# Patient Record
Sex: MALE | Race: WHITE | NOT HISPANIC OR LATINO | Employment: FULL TIME | URBAN - METROPOLITAN AREA
[De-identification: names, ages, dates, MRNs, and addresses within clinical notes are randomized per-mention and may not be internally consistent; named-entity substitution may affect disease eponyms.]

---

## 2017-05-05 ENCOUNTER — HOSPITAL ENCOUNTER (INPATIENT)
Facility: HOSPITAL | Age: 42
LOS: 2 days | Discharge: HOME | DRG: 379 | End: 2017-05-07
Attending: EMERGENCY MEDICINE | Admitting: INTERNAL MEDICINE
Payer: COMMERCIAL

## 2017-05-05 DIAGNOSIS — I10 ESSENTIAL HYPERTENSION: ICD-10-CM

## 2017-05-05 DIAGNOSIS — K92.2 LOWER GI BLEED: Primary | ICD-10-CM

## 2017-05-05 DIAGNOSIS — D64.9 ANEMIA, UNSPECIFIED TYPE: ICD-10-CM

## 2017-05-05 DIAGNOSIS — E87.6 HYPOKALEMIA: ICD-10-CM

## 2017-05-05 DIAGNOSIS — R42 ORTHOSTATIC DIZZINESS: ICD-10-CM

## 2017-05-05 PROBLEM — E78.5 HLD (HYPERLIPIDEMIA): Status: ACTIVE | Noted: 2017-05-05

## 2017-05-05 LAB
ALBUMIN SERPL BCP-MCNC: 4 G/DL
ALP SERPL-CCNC: 99 U/L
ALT SERPL W/O P-5'-P-CCNC: 24 U/L
ANION GAP SERPL CALC-SCNC: 13 MMOL/L
ANISOCYTOSIS BLD QL SMEAR: SLIGHT
APTT BLDCRRT: 22.8 SEC
AST SERPL-CCNC: 26 U/L
BASOPHILS # BLD AUTO: 0.06 K/UL
BASOPHILS NFR BLD: 0.7 %
BILIRUB SERPL-MCNC: 0.3 MG/DL
BILIRUB UR QL STRIP: NEGATIVE
BUN SERPL-MCNC: 23 MG/DL
CALCIUM SERPL-MCNC: 9.2 MG/DL
CHLORIDE SERPL-SCNC: 104 MMOL/L
CLARITY UR: CLEAR
CO2 SERPL-SCNC: 22 MMOL/L
COLOR UR: YELLOW
CREAT SERPL-MCNC: 1.1 MG/DL
DACRYOCYTES BLD QL SMEAR: ABNORMAL
DIFFERENTIAL METHOD: ABNORMAL
EOSINOPHIL # BLD AUTO: 0.2 K/UL
EOSINOPHIL NFR BLD: 1.8 %
ERYTHROCYTE [DISTWIDTH] IN BLOOD BY AUTOMATED COUNT: 16.7 %
EST. GFR  (AFRICAN AMERICAN): >60 ML/MIN/1.73 M^2
EST. GFR  (NON AFRICAN AMERICAN): >60 ML/MIN/1.73 M^2
GLUCOSE SERPL-MCNC: 129 MG/DL
GLUCOSE UR QL STRIP: NEGATIVE
HCT VFR BLD AUTO: 20.3 %
HGB BLD-MCNC: 6.5 G/DL
HGB UR QL STRIP: NEGATIVE
HYPOCHROMIA BLD QL SMEAR: ABNORMAL
INR PPP: 1
KETONES UR QL STRIP: NEGATIVE
LEUKOCYTE ESTERASE UR QL STRIP: NEGATIVE
LYMPHOCYTES # BLD AUTO: 2.4 K/UL
LYMPHOCYTES NFR BLD: 28.6 %
MCH RBC QN AUTO: 21.6 PG
MCHC RBC AUTO-ENTMCNC: 32 %
MCV RBC AUTO: 67 FL
MONOCYTES # BLD AUTO: 0.9 K/UL
MONOCYTES NFR BLD: 10.2 %
NEUTROPHILS # BLD AUTO: 4.9 K/UL
NEUTROPHILS NFR BLD: 59.1 %
NITRITE UR QL STRIP: NEGATIVE
OVALOCYTES BLD QL SMEAR: ABNORMAL
PH UR STRIP: 6 [PH] (ref 5–8)
PLATELET # BLD AUTO: 449 K/UL
PLATELET BLD QL SMEAR: ABNORMAL
PMV BLD AUTO: 8.3 FL
POIKILOCYTOSIS BLD QL SMEAR: SLIGHT
POLYCHROMASIA BLD QL SMEAR: ABNORMAL
POTASSIUM SERPL-SCNC: 3.2 MMOL/L
PROT SERPL-MCNC: 8.9 G/DL
PROT UR QL STRIP: NEGATIVE
PROTHROMBIN TIME: 10.5 SEC
RBC # BLD AUTO: 3.01 M/UL
SODIUM SERPL-SCNC: 139 MMOL/L
SP GR UR STRIP: 1.02 (ref 1–1.03)
TARGETS BLD QL SMEAR: ABNORMAL
URN SPEC COLLECT METH UR: NORMAL
UROBILINOGEN UR STRIP-ACNC: NEGATIVE EU/DL
WBC # BLD AUTO: 8.35 K/UL

## 2017-05-05 PROCEDURE — 25000003 PHARM REV CODE 250: Performed by: EMERGENCY MEDICINE

## 2017-05-05 PROCEDURE — 25000003 PHARM REV CODE 250: Performed by: NURSE PRACTITIONER

## 2017-05-05 PROCEDURE — 86901 BLOOD TYPING SEROLOGIC RH(D): CPT

## 2017-05-05 PROCEDURE — 99285 EMERGENCY DEPT VISIT HI MDM: CPT | Mod: 25

## 2017-05-05 PROCEDURE — 85025 COMPLETE CBC W/AUTO DIFF WBC: CPT

## 2017-05-05 PROCEDURE — 36000 PLACE NEEDLE IN VEIN: CPT

## 2017-05-05 PROCEDURE — 86920 COMPATIBILITY TEST SPIN: CPT

## 2017-05-05 PROCEDURE — 81003 URINALYSIS AUTO W/O SCOPE: CPT

## 2017-05-05 PROCEDURE — 80053 COMPREHEN METABOLIC PANEL: CPT

## 2017-05-05 PROCEDURE — 85610 PROTHROMBIN TIME: CPT

## 2017-05-05 PROCEDURE — 85730 THROMBOPLASTIN TIME PARTIAL: CPT

## 2017-05-05 PROCEDURE — 21400001 HC TELEMETRY ROOM

## 2017-05-05 PROCEDURE — 86900 BLOOD TYPING SEROLOGIC ABO: CPT

## 2017-05-05 PROCEDURE — 86850 RBC ANTIBODY SCREEN: CPT

## 2017-05-05 RX ORDER — ONDANSETRON 2 MG/ML
4 INJECTION INTRAMUSCULAR; INTRAVENOUS EVERY 12 HOURS PRN
Status: DISCONTINUED | OUTPATIENT
Start: 2017-05-05 | End: 2017-05-05

## 2017-05-05 RX ORDER — ATORVASTATIN CALCIUM 10 MG/1
10 TABLET, FILM COATED ORAL DAILY
COMMUNITY

## 2017-05-05 RX ORDER — HYDROCHLOROTHIAZIDE 12.5 MG/1
12.5 TABLET ORAL DAILY
COMMUNITY

## 2017-05-05 RX ORDER — AMLODIPINE BESYLATE 5 MG/1
5 TABLET ORAL DAILY
COMMUNITY

## 2017-05-05 RX ORDER — ALPRAZOLAM 0.5 MG/1
0.5 TABLET ORAL 3 TIMES DAILY
COMMUNITY

## 2017-05-05 RX ORDER — POLYETHYLENE GLYCOL 3350, SODIUM SULFATE ANHYDROUS, SODIUM BICARBONATE, SODIUM CHLORIDE, POTASSIUM CHLORIDE 236; 22.74; 6.74; 5.86; 2.97 G/4L; G/4L; G/4L; G/4L; G/4L
4000 POWDER, FOR SOLUTION ORAL ONCE
Status: COMPLETED | OUTPATIENT
Start: 2017-05-05 | End: 2017-05-05

## 2017-05-05 RX ORDER — HYDROCODONE BITARTRATE AND ACETAMINOPHEN 500; 5 MG/1; MG/1
TABLET ORAL
Status: DISCONTINUED | OUTPATIENT
Start: 2017-05-05 | End: 2017-05-06

## 2017-05-05 RX ORDER — ONDANSETRON 2 MG/ML
4 INJECTION INTRAMUSCULAR; INTRAVENOUS EVERY 8 HOURS PRN
Status: DISCONTINUED | OUTPATIENT
Start: 2017-05-05 | End: 2017-05-07 | Stop reason: HOSPADM

## 2017-05-05 RX ORDER — SODIUM CHLORIDE 9 MG/ML
INJECTION, SOLUTION INTRAVENOUS CONTINUOUS
Status: DISCONTINUED | OUTPATIENT
Start: 2017-05-05 | End: 2017-05-07 | Stop reason: HOSPADM

## 2017-05-05 RX ORDER — ACETAMINOPHEN 325 MG/1
650 TABLET ORAL EVERY 8 HOURS PRN
Status: DISCONTINUED | OUTPATIENT
Start: 2017-05-05 | End: 2017-05-07 | Stop reason: HOSPADM

## 2017-05-05 RX ORDER — POTASSIUM CHLORIDE 20 MEQ/1
40 TABLET, EXTENDED RELEASE ORAL ONCE
Status: COMPLETED | OUTPATIENT
Start: 2017-05-05 | End: 2017-05-05

## 2017-05-05 RX ORDER — PANTOPRAZOLE SODIUM 40 MG/10ML
40 INJECTION, POWDER, LYOPHILIZED, FOR SOLUTION INTRAVENOUS 2 TIMES DAILY
Status: DISCONTINUED | OUTPATIENT
Start: 2017-05-05 | End: 2017-05-07 | Stop reason: HOSPADM

## 2017-05-05 RX ORDER — ATORVASTATIN CALCIUM 10 MG/1
10 TABLET, FILM COATED ORAL DAILY
Status: DISCONTINUED | OUTPATIENT
Start: 2017-05-06 | End: 2017-05-07 | Stop reason: HOSPADM

## 2017-05-05 RX ORDER — IPRATROPIUM BROMIDE AND ALBUTEROL SULFATE 2.5; .5 MG/3ML; MG/3ML
3 SOLUTION RESPIRATORY (INHALATION) EVERY 6 HOURS PRN
Status: DISCONTINUED | OUTPATIENT
Start: 2017-05-05 | End: 2017-05-07 | Stop reason: HOSPADM

## 2017-05-05 RX ADMIN — SODIUM CHLORIDE: 0.9 INJECTION, SOLUTION INTRAVENOUS at 11:05

## 2017-05-05 RX ADMIN — POTASSIUM CHLORIDE 40 MEQ: 1500 TABLET, EXTENDED RELEASE ORAL at 08:05

## 2017-05-05 RX ADMIN — POLYETHYLENE GLYCOL 3350, SODIUM SULFATE ANHYDROUS, SODIUM BICARBONATE, SODIUM CHLORIDE, POTASSIUM CHLORIDE 4000 ML: 236; 22.74; 6.74; 5.86; 2.97 POWDER, FOR SOLUTION ORAL at 08:05

## 2017-05-05 NOTE — ASSESSMENT & PLAN NOTE
- Borderline hypotension -- orthostatic -- Hold Amlodipine and HCTZ  - IVF  - Resume BB with parameters -- Hold BB if systolic BP < 110 mmHg and/or HR < 60 BPM

## 2017-05-05 NOTE — ED PROVIDER NOTES
"SCRIBE #1 NOTE: I, Ignacio Sanchez, am scribing for, and in the presence of, Sohan Layton MD. I have scribed the entire note.      History      Chief Complaint   Patient presents with    Cough     prolonged cough since lanuary, dyspnea on exertion    Jaundice     jaundice appearing, rectal bleeding reported        Review of patient's allergies indicates:  No Known Allergies     HPI   HPI    5/5/2017, 4:07 PM   History obtained from the patient      History of Present Illness: Doug Arauz is a 42 y.o. male patient who presents to the Emergency Department for blood in stool which onset suddenly 4 hours PTA. Symptoms are intermittent and moderate in severity. Pt states "1 gallon of blood" came out when he went to the bathroom. Pt was seen by Dr. Ibarra and was told to come to the ED for an evaluation. Pt has had a cough since January. Pt gets dizzy when he stands up. No mitigating or exacerbating factors reported. Associated sxs include n/v and fatigue. Patient denies any fever, chills, abdominal pain, CP, SOB, HA, numbness, weakness, dysuria, hematuria, and all other sxs at this time. No further complaints or concerns at this time.         Arrival mode: Personal vehicle     PCP: Niko Camacho MD       Past Medical History:  Past Medical History:   Diagnosis Date    Asthma     Colon polyps     Hiatal hernia     High cholesterol     Hypertension     Macular degeneration     RVOT ventricular tachycardia        Past Surgical History:  Past surgical history reviewed not relevant      Family History:  Family history reviewed not relevant      Social History:  Social History    Social History Main Topics    Social History Main Topics    Smoking status: Unknown if ever smoked    Smokeless tobacco: Unknown if ever used    Alcohol Use: Unknown drinking history    Drug Use: Unknown if ever used    Sexual Activity: Unknown       ROS   Review of Systems   Constitutional: Positive for fatigue. Negative for " chills and fever.   HENT: Negative for sore throat.    Respiratory: Positive for cough. Negative for shortness of breath.    Cardiovascular: Negative for chest pain.   Gastrointestinal: Positive for blood in stool, nausea and vomiting. Negative for abdominal pain.   Genitourinary: Negative for dysuria and hematuria.   Musculoskeletal: Negative for back pain.   Skin: Negative for rash.   Neurological: Positive for dizziness. Negative for weakness, light-headedness, numbness and headaches.   Hematological: Does not bruise/bleed easily.       Physical Exam    Initial Vitals   BP Pulse Resp Temp SpO2   05/05/17 1334 05/05/17 1334 05/05/17 1334 05/05/17 1334 05/05/17 1334   142/74 98 16 98.6 °F (37 °C) 99 %      Physical Exam  Nursing Notes and Vital Signs Reviewed.  Constitutional: Patient is in no acute distress. Awake and alert. Well-developed and well-nourished.  Head: Atraumatic. Normocephalic.  Eyes: PERRL. EOM intact. Conjunctivae are not pale. No scleral icterus.  ENT: Mucous membranes are moist. Oropharynx is clear and symmetric.    Neck: Supple. Full ROM. No lymphadenopathy.  Cardiovascular: Tachycardic.  Regular rhythm. No murmurs, rubs, or gallops. Distal pulses are 2+ and symmetric.  Pulmonary/Chest: No respiratory distress. Clear to auscultation bilaterally. No wheezing, rales, or rhonchi.  Abdominal: Soft and non-distended.  There is no tenderness.  No rebound, guarding, or rigidity.  Musculoskeletal: Moves all extremities. No obvious deformities. No edema.  Skin: Pale.  Neurological:  Alert, awake, and appropriate.  Normal speech.  No acute focal neurological deficits are appreciated.  Psychiatric: Normal affect. Good eye contact. Appropriate in content.    ED Course    Procedures  ED Vital Signs:  Vitals:    05/05/17 1334 05/05/17 1556 05/05/17 1646 05/05/17 1647   BP: (!) 142/74 125/69 118/68 124/76   Pulse: 98 (!) 120     Resp: 16 20     Temp: 98.6 °F (37 °C) 98.4 °F (36.9 °C)     TempSrc: Oral Oral    "  SpO2: 99% 100%     Weight: 81.6 kg (180 lb)      Height: 5' 6" (1.676 m)       05/05/17 1648 05/05/17 1720 05/05/17 1721 05/05/17 1749   BP: 102/71   (!) 117/48   Pulse:  110  (!) 111   Resp:  (!) 29  (!) 22   Temp:   97.8 °F (36.6 °C)    TempSrc:   Oral    SpO2:  100%  100%   Weight:       Height:        05/05/17 1921 05/05/17 2215   BP: 118/72 118/75   Pulse: 109 97   Resp: (!) 24 (!) 22   Temp:  98.9 °F (37.2 °C)   TempSrc:  Oral   SpO2: 100% 99%   Weight:  81.8 kg (180 lb 6.4 oz)   Height:  5' 6" (1.676 m)       Abnormal Lab Results:  Labs Reviewed   CBC W/ AUTO DIFFERENTIAL - Abnormal; Notable for the following:        Result Value    RBC 3.01 (*)     Hemoglobin 6.5 (*)     Hematocrit 20.3 (*)     MCV 67 (*)     MCH 21.6 (*)     RDW 16.7 (*)     Platelets 449 (*)     MPV 8.3 (*)     Platelet Estimate Increased (*)     All other components within normal limits   COMPREHENSIVE METABOLIC PANEL - Abnormal; Notable for the following:     Potassium 3.2 (*)     CO2 22 (*)     Glucose 129 (*)     BUN, Bld 23 (*)     Total Protein 8.9 (*)     All other components within normal limits   URINALYSIS   APTT   PROTIME-INR   TYPE & SCREEN   PREPARE RBC SOFT        All Lab Results:  Results for orders placed or performed during the hospital encounter of 05/05/17   CBC auto differential   Result Value Ref Range    WBC 8.35 3.90 - 12.70 K/uL    RBC 3.01 (L) 4.60 - 6.20 M/uL    Hemoglobin 6.5 (L) 14.0 - 18.0 g/dL    Hematocrit 20.3 (L) 40.0 - 54.0 %    MCV 67 (L) 82 - 98 fL    MCH 21.6 (L) 27.0 - 31.0 pg    MCHC 32.0 32.0 - 36.0 %    RDW 16.7 (H) 11.5 - 14.5 %    Platelets 449 (H) 150 - 350 K/uL    MPV 8.3 (L) 9.2 - 12.9 fL    Gran # 4.9 1.8 - 7.7 K/uL    Lymph # 2.4 1.0 - 4.8 K/uL    Mono # 0.9 0.3 - 1.0 K/uL    Eos # 0.2 0.0 - 0.5 K/uL    Baso # 0.06 0.00 - 0.20 K/uL    Gran% 59.1 38.0 - 73.0 %    Lymph% 28.6 18.0 - 48.0 %    Mono% 10.2 4.0 - 15.0 %    Eosinophil% 1.8 0.0 - 8.0 %    Basophil% 0.7 0.0 - 1.9 %    Platelet " Estimate Increased (A)     Aniso Slight     Poik Slight     Poly Occasional     Hypo Occasional     Ovalocytes Occasional     Target Cells Moderate     Tear Drop Cells Moderate     Differential Method Automated    Comprehensive metabolic panel   Result Value Ref Range    Sodium 139 136 - 145 mmol/L    Potassium 3.2 (L) 3.5 - 5.1 mmol/L    Chloride 104 95 - 110 mmol/L    CO2 22 (L) 23 - 29 mmol/L    Glucose 129 (H) 70 - 110 mg/dL    BUN, Bld 23 (H) 6 - 20 mg/dL    Creatinine 1.1 0.5 - 1.4 mg/dL    Calcium 9.2 8.7 - 10.5 mg/dL    Total Protein 8.9 (H) 6.0 - 8.4 g/dL    Albumin 4.0 3.5 - 5.2 g/dL    Total Bilirubin 0.3 0.1 - 1.0 mg/dL    Alkaline Phosphatase 99 55 - 135 U/L    AST 26 10 - 40 U/L    ALT 24 10 - 44 U/L    Anion Gap 13 8 - 16 mmol/L    eGFR if African American >60 >60 mL/min/1.73 m^2    eGFR if non African American >60 >60 mL/min/1.73 m^2   Urinalysis   Result Value Ref Range    Specimen UA Urine, Clean Catch     Color, UA Yellow Yellow, Straw, Hawa    Appearance, UA Clear Clear    pH, UA 6.0 5.0 - 8.0    Specific Gravity, UA 1.020 1.005 - 1.030    Protein, UA Negative Negative    Glucose, UA Negative Negative    Ketones, UA Negative Negative    Bilirubin (UA) Negative Negative    Occult Blood UA Negative Negative    Nitrite, UA Negative Negative    Urobilinogen, UA Negative <2.0 EU/dL    Leukocytes, UA Negative Negative   APTT   Result Value Ref Range    aPTT 22.8 21.0 - 32.0 sec   Protime-INR   Result Value Ref Range    Prothrombin Time 10.5 9.0 - 12.5 sec    INR 1.0 0.8 - 1.2         Imaging Results:  Imaging Results         NM GI Bleed Study (Final result) Result time:  05/05/17 22:15:07    Final result by Zhang Car MD (05/05/17 22:15:07)    Impression:       1.  Over the 1st 55 minutes, no definite evidence for GI bleed, however, progressive uptake in the urinary bladder, likely representing free technetium, could obscure a very distal rectal or anal bleed.  2.  On the 60 minute image, there  was a similar distribution of the radiopharmaceutical.  3.  At no time, was there evidence for abnormal uptake involving the small bowel or the vast majority of the colon.         Electronically signed by: JASON LÓPEZ MD  Date:     05/05/17  Time:    22:15     Narrative:    Nuclear medicine GI bleeding scan    Clinical Indication:    Lower GI bleeding with bright red blood per rectum.      Technique: 25 mCi of technetium 99m labeled tagged red blood cells was injected in the patient with imaging obtained for one hour.     Findings:  No comparison studies are available.  There was progressive activity centered in the inferior pelvis, likely representing the urinary bladder.  No other abnormal uptake was seen over the 1st 55 minutes of the study.  At the 55 minute daniele, the patient needs to urinate.  The patient went to the restroom, and urinated, and also described bright red blood per rectum.  A 60 minute image was obtained, demonstrating a similar appearance to the distribution of the radiopharmaceutical.            X-Ray Chest PA And Lateral (Final result) Result time:  05/05/17 16:56:57    Final result by Ignacio Fried III, MD (05/05/17 16:56:57)    Impression:         Negative two-view chest x-ray.      Electronically signed by: IGNACIO FRIED MD  Date:     05/05/17  Time:    16:56     Narrative:    Two-view chest x-ray.    Clinical indication: coughwith    Heart size is normal. The lung fields are clear. No acute pulmonary infiltrate.                      The Emergency Provider reviewed the vital signs and test results, which are outlined above.    ED Discussion     5:15 PM: Re-evaluated pt. Pt is ok with a blood transfusion. I have discussed test results, shared treatment plan, and the need for admission with patient and family at bedside. Pt and family express understanding at this time and agree with all information. All questions answered. Pt and family have no further questions or concerns at  this time. Pt is ready for admit.    5:38 PM: Dr. Layton discussed the pt's case with Alyx King NP ([facility]) who recommends to talk to GI before admission.    5:44 PM: Dr. Layton discussed the pt's case with Dr. Gomes (GI) who recommends a NM bleed study, clear liquids overnight, and a possible colonoscopy tomorrow.    6:06 PM: Discussed case with Alyx King NP ([facility]). Dr. Hernandez agrees with current care and management of pt and accepts admission.   Admitting Service: Hospital medicine   Admitting Physician: Dr. Hernandez  Admit to: Telemetry          ED Medication(s):  Medications   0.9%  NaCl infusion (for blood administration) (not administered)   promethazine (PHENERGAN) 6.25 mg in dextrose 5 % 50 mL IVPB (not administered)   acetaminophen tablet 650 mg (not administered)   0.9%  NaCl infusion (not administered)   atorvastatin tablet 10 mg (not administered)   propranolol 24 hr capsule 80 mg (not administered)   pantoprazole injection 40 mg (not administered)   ondansetron injection 4 mg (not administered)   albuterol-ipratropium 2.5mg-0.5mg/3mL nebulizer solution 3 mL (not administered)   polyethylene glycol (GoLYTELY) solution (4,000 mLs Oral Given 5/5/17 2001)   potassium chloride SA CR tablet 40 mEq (40 mEq Oral Given 5/5/17 2001)       Current Discharge Medication List                Medical Decision Making    Medical Decision Making:   Clinical Tests:   Lab Tests: Reviewed and Ordered  Radiological Study: Reviewed and Ordered           Scribe Attestation:   Scribe #1: I performed the above scribed service and the documentation accurately describes the services I performed. I attest to the accuracy of the note.    Attending:   Physician Attestation Statement for Scribe #1: I, Sohan Layton MD, personally performed the services described in this documentation, as scribed by Ignacio Sanchez, in my presence, and it is both accurate and complete.          Clinical Impression        ICD-10-CM ICD-9-CM   1. Lower GI bleed K92.2 578.9   2. Anemia, unspecified type D64.9 285.9   3. Orthostatic dizziness R42 780.4       Disposition:   Disposition: Admitted  Condition: Fair         Sohan Layton MD  05/05/17 4786

## 2017-05-05 NOTE — SUBJECTIVE & OBJECTIVE
Past Medical History:   Diagnosis Date    Asthma     Colon polyps     Hiatal hernia     High cholesterol     Hypertension     Macular degeneration     RVOT ventricular tachycardia        No past surgical history on file.    Review of patient's allergies indicates:  No Known Allergies    No current facility-administered medications on file prior to encounter.      No current outpatient prescriptions on file prior to encounter.     Family History     None        Social History Main Topics    Smoking status: Not on file    Smokeless tobacco: Not on file    Alcohol use Not on file    Drug use: Not on file    Sexual activity: Not on file     Review of Systems   Constitutional: Negative for appetite change, chills, fatigue and fever.   HENT: Negative for tinnitus and trouble swallowing.    Eyes: Negative.    Respiratory: Positive for cough. Negative for apnea, choking, chest tightness, shortness of breath, wheezing and stridor.    Cardiovascular: Negative for chest pain, palpitations and leg swelling.   Gastrointestinal: Positive for blood in stool. Negative for abdominal pain, diarrhea, nausea and vomiting.   Endocrine: Negative.    Genitourinary: Negative.    Musculoskeletal: Negative.    Skin: Negative.    Allergic/Immunologic: Negative.    Neurological: Negative for dizziness, tremors, seizures, syncope, facial asymmetry, speech difficulty, weakness, light-headedness, numbness and headaches.   Hematological: Negative.      Objective:     Vital Signs (Most Recent):  Temp: 97.8 °F (36.6 °C) (05/05/17 1721)  Pulse: 110 (05/05/17 1720)  Resp: (!) 29 (05/05/17 1720)  BP: 102/71 (05/05/17 1648)  SpO2: 100 % (05/05/17 1720) Vital Signs (24h Range):  Temp:  [97.8 °F (36.6 °C)-98.6 °F (37 °C)] 97.8 °F (36.6 °C)  Pulse:  [] 110  Resp:  [16-29] 29  SpO2:  [99 %-100 %] 100 %  BP: (102-142)/(68-76) 102/71     Weight: 81.6 kg (180 lb)  Body mass index is 29.05 kg/(m^2).    Physical Exam   Constitutional: He is  oriented to person, place, and time. He appears well-developed.   HENT:   Head: Normocephalic and atraumatic.   Eyes: Conjunctivae and EOM are normal.   Pale conjunctiva   Neck: Normal range of motion. Neck supple.   Cardiovascular: Intact distal pulses.  Tachycardia present.    Pulmonary/Chest: Effort normal and breath sounds normal. No respiratory distress.   Abdominal: Soft. Bowel sounds are normal. There is no tenderness.   Musculoskeletal: Normal range of motion.   Neurological: He is alert and oriented to person, place, and time.   Skin: Skin is warm and dry.   Psychiatric: He has a normal mood and affect. His behavior is normal. Judgment and thought content normal.   Vitals reviewed.       Significant Labs:   CBC:   Recent Labs  Lab 05/05/17  1645   WBC 8.35   HGB 6.5*   HCT 20.3*   *     CMP:   Recent Labs  Lab 05/05/17  1645      K 3.2*      CO2 22*   *   BUN 23*   CREATININE 1.1   CALCIUM 9.2   PROT 8.9*   ALBUMIN 4.0   BILITOT 0.3   ALKPHOS 99   AST 26   ALT 24   ANIONGAP 13   EGFRNONAA >60     Coagulation:   Recent Labs  Lab 05/05/17  1645   INR 1.0   APTT 22.8     Urine Studies:   Recent Labs  Lab 05/05/17  1645   COLORU Yellow   APPEARANCEUA Clear   PHUR 6.0   SPECGRAV 1.020   PROTEINUA Negative   GLUCUA Negative   KETONESU Negative   BILIRUBINUA Negative   OCCULTUA Negative   NITRITE Negative   UROBILINOGEN Negative   LEUKOCYTESUR Negative       Significant Imaging:   Imaging Results         X-Ray Chest PA And Lateral (Final result) Result time:  05/05/17 16:56:57    Final result by Ignacio Fried III, MD (05/05/17 16:56:57)    Impression:         Negative two-view chest x-ray.      Electronically signed by: IGNACIO FRIED MD  Date:     05/05/17  Time:    16:56     Narrative:    Two-view chest x-ray.    Clinical indication: coughwith    Heart size is normal. The lung fields are clear. No acute pulmonary infiltrate.

## 2017-05-05 NOTE — H&P
"[facility] Hospital Medicine  History & Physical    Patient Name: Doug Arauz  MRN: [id number]  Admission Date: 5/5/2017  Attending Physician: Sohan Layton MD   Primary Care Provider: Niko Camacho MD         Patient information was obtained from patient and ER records.     Subjective:     Principal Problem:Lower GI bleed    Chief Complaint:   Chief Complaint   Patient presents with    Cough     prolonged cough since lanuary, dyspnea on exertion    Jaundice     jaundice appearing, rectal bleeding reported         HPI: Doug Arauz is a 42 year old male with a PMHx of HTN, HLD, Asthma, and Hiatal hernia who presented to the Emergency Department with c/o blood in stool x 4 hours. Pt states going to the bathroom and "1 gallon of blood came out." ED workup revealed: Hgb/Hct 6.5/20.3, K+ 3.2, BUN 23. ED discussed case with GI who recommending bleeding scan, clear liquids overnight, and possible colonoscopy tomorrow. Pt admitted to Telemetry for GI Bleed.     Past Medical History:   Diagnosis Date    Asthma     Colon polyps     Hiatal hernia     High cholesterol     Hypertension     Macular degeneration     RVOT ventricular tachycardia        No past surgical history on file.    Review of patient's allergies indicates:  No Known Allergies    No current facility-administered medications on file prior to encounter.      No current outpatient prescriptions on file prior to encounter.     Family History     None        Social History Main Topics    Smoking status: Not on file    Smokeless tobacco: Not on file    Alcohol use Not on file    Drug use: Not on file    Sexual activity: Not on file     Review of Systems   Constitutional: Negative for appetite change, chills, fatigue and fever.   HENT: Negative for tinnitus and trouble swallowing.    Eyes: Negative.    Respiratory: Positive for cough. Negative for apnea, choking, chest tightness, shortness of breath, wheezing and " stridor.    Cardiovascular: Negative for chest pain, palpitations and leg swelling.   Gastrointestinal: Positive for blood in stool. Negative for abdominal pain, diarrhea, nausea and vomiting.   Endocrine: Negative.    Genitourinary: Negative.    Musculoskeletal: Negative.    Skin: Negative.    Allergic/Immunologic: Negative.    Neurological: Negative for dizziness, tremors, seizures, syncope, facial asymmetry, speech difficulty, weakness, light-headedness, numbness and headaches.   Hematological: Negative.      Objective:     Vital Signs (Most Recent):  Temp: 97.8 °F (36.6 °C) (05/05/17 1721)  Pulse: 110 (05/05/17 1720)  Resp: (!) 29 (05/05/17 1720)  BP: 102/71 (05/05/17 1648)  SpO2: 100 % (05/05/17 1720) Vital Signs (24h Range):  Temp:  [97.8 °F (36.6 °C)-98.6 °F (37 °C)] 97.8 °F (36.6 °C)  Pulse:  [] 110  Resp:  [16-29] 29  SpO2:  [99 %-100 %] 100 %  BP: (102-142)/(68-76) 102/71     Weight: 81.6 kg (180 lb)  Body mass index is 29.05 kg/(m^2).    Physical Exam   Constitutional: He is oriented to person, place, and time. He appears well-developed.   HENT:   Head: Normocephalic and atraumatic.   Eyes: Conjunctivae and EOM are normal.   Pale conjunctiva   Neck: Normal range of motion. Neck supple.   Cardiovascular: Intact distal pulses.  Tachycardia present.    Pulmonary/Chest: Effort normal and breath sounds normal. No respiratory distress.   Abdominal: Soft. Bowel sounds are normal. There is no tenderness.   Musculoskeletal: Normal range of motion.   Neurological: He is alert and oriented to person, place, and time.   Skin: Skin is warm and dry.   Psychiatric: He has a normal mood and affect. His behavior is normal. Judgment and thought content normal.   Vitals reviewed.       Significant Labs:   CBC:   Recent Labs  Lab 05/05/17  1645   WBC 8.35   HGB 6.5*   HCT 20.3*   *     CMP:   Recent Labs  Lab 05/05/17  1645      K 3.2*      CO2 22*   *   BUN 23*   CREATININE 1.1   CALCIUM 9.2    PROT 8.9*   ALBUMIN 4.0   BILITOT 0.3   ALKPHOS 99   AST 26   ALT 24   ANIONGAP 13   EGFRNONAA >60     Coagulation:   Recent Labs  Lab 05/05/17  1645   INR 1.0   APTT 22.8     Urine Studies:   Recent Labs  Lab 05/05/17  1645   COLORU Yellow   APPEARANCEUA Clear   PHUR 6.0   SPECGRAV 1.020   PROTEINUA Negative   GLUCUA Negative   KETONESU Negative   BILIRUBINUA Negative   OCCULTUA Negative   NITRITE Negative   UROBILINOGEN Negative   LEUKOCYTESUR Negative       Significant Imaging:   Imaging Results         X-Ray Chest PA And Lateral (Final result) Result time:  05/05/17 16:56:57    Final result by Ignacio Fried III, MD (05/05/17 16:56:57)    Impression:         Negative two-view chest x-ray.      Electronically signed by: IGNACIO FRIED MD  Date:     05/05/17  Time:    16:56     Narrative:    Two-view chest x-ray.    Clinical indication: coughwith    Heart size is normal. The lung fields are clear. No acute pulmonary infiltrate.            Assessment/Plan:     * Lower GI bleed  - Admit to Telemetry  - Inpatient consult to Gastroenterology  - ED discussed case with GI who recommended bleeding scan, clear liquids, and possible colonoscopy in AM  - NPO after midnight  - Type and Screen  - Transfuse 2 units of PRBCs now  - PPI BID  - Supplemental oxygen prn, keep O2 sats > 92%      Hypokalemia  - To replete  - Repeat BMP in AM      HTN (hypertension)  - Borderline hypotension -- orthostatic -- Hold Amlodipine and HCTZ  - IVF  - Resume BB with parameters -- Hold BB if systolic BP < 110 mmHg and/or HR < 60 BPM      HLD (hyperlipidemia)  - Continue Statin      VTE Risk Mitigation         Ordered     Medium Risk of VTE  Once      05/05/17 1832        CAREN Bolaños  Department of Hospital Medicine   [facility]

## 2017-05-05 NOTE — ASSESSMENT & PLAN NOTE
- Admit to Telemetry  - Inpatient consult to Gastroenterology  - ED discussed case with GI who recommended bleeding scan, clear liquids, and possible colonoscopy in AM  - NPO after midnight  - Type and Screen  - Transfuse 2 units of PRBCs now  - PPI BID  - Supplemental oxygen prn, keep O2 sats > 92%

## 2017-05-05 NOTE — IP AVS SNAPSHOT
[address] [place] [address]           Patient Discharge Instructions   Our goal is to set you up for success. This packet includes information on your condition, medications, and your home care.  It will help you care for yourself to prevent having to return to the hospital.     Please ask your nurse if you have any questions.      There are many details to remember when preparing to leave the hospital. Here is what you will need to do:    1. Take your medicine. If you are prescribed medications, review your Medication List on the following pages. You may have new medications to  at the pharmacy and others that you'll need to stop taking. Review the instructions for how and when to take your medications. Talk with your doctor or nurses if you are unsure of what to do.     2. Go to your follow-up appointments. Specific follow-up information is listed in the following pages. Your may be contacted by a nurse or clinical provider about future appointments. Be sure we have all of the phone numbers to reach you. Please contact your provider's office if you are unable to make an appointment.     3. Watch for warning signs. Your doctor or nurse will give you detailed warning signs to watch for and when to call for assistance. These instructions may also include educational information about your condition. If you experience any of warning signs to your health, call your doctor.               ** Verify the list of medication(s) below is accurate and up to date. Carry this with you in case of emergency. If your medications have changed, please notify your healthcare provider.             Medication List      CONTINUE taking these medications        Additional Info                      alprazolam 0.5 MG tablet   Commonly known as:  XANAX   Refills:  0   Dose:  0.5 mg    Instructions:  Take 0.5 mg by mouth 3 (three) times daily.     Begin Date    AM    Noon    PM     Bedtime       amlodipine 5 MG tablet   Commonly known as:  NORVASC   Refills:  0   Dose:  5 mg    Instructions:  Take 5 mg by mouth once daily.     Begin Date    AM    Noon    PM    Bedtime       atorvastatin 10 MG tablet   Commonly known as:  LIPITOR   Refills:  0   Dose:  10 mg    Last time this was given:  10 mg on 5/7/2017  8:56 AM   Instructions:  Take 10 mg by mouth once daily.     Begin Date    AM    Noon    PM    Bedtime       CLARITIN ORAL   Refills:  0    Instructions:  Take by mouth.     Begin Date    AM    Noon    PM    Bedtime       hydrochlorothiazide 12.5 MG Tab   Commonly known as:  HYDRODIURIL   Refills:  0   Dose:  12.5 mg    Instructions:  Take 12.5 mg by mouth once daily.     Begin Date    AM    Noon    PM    Bedtime       multivitamin capsule   Refills:  0   Dose:  1 capsule    Instructions:  Take 1 capsule by mouth once daily.     Begin Date    AM    Noon    PM    Bedtime       PROAIR HFA INHL   Refills:  0    Instructions:  Inhale into the lungs.     Begin Date    AM    Noon    PM    Bedtime       propranolol 80 mg 24 hr capsule   Commonly known as:  INNOPRAN XL   Refills:  0   Dose:  80 mg    Instructions:  Take 80 mg by mouth every evening.     Begin Date    AM    Noon    PM    Bedtime                  Please bring to all follow up appointments:    1. A copy of your discharge instructions.  2. All medicines you are currently taking in their original bottles.  3. Identification and insurance card.    Please arrive 15 minutes ahead of scheduled appointment time.    Please call 24 hours in advance if you must reschedule your appointment and/or time.        Follow-up Information     Follow up with Niko Camacho MD. Schedule an appointment as soon as possible for a visit in 3 days.    Specialty:  Internal Medicine    Why:  hospital follow up    Contact information:    [address] [facility] [address] [phone]          Follow up with Brennon Gomes Iii, MD. Schedule an appointment  "as soon as possible for a visit in 1 week.    Specialty:  Gastroenterology    Why:  hospital follow up    Contact information:    [address] [place] [address] [phone]          Discharge Instructions     Future Orders    Activity as tolerated     Call MD for:  difficulty breathing or increased cough     Call MD for:  increased confusion or weakness     Call MD for:  persistent dizziness, light-headedness, or visual disturbances     Diet general     Questions:    Total calories:      Fat restriction, if any:      Protein restriction, if any:      Na restriction, if any:      Fluid restriction:      Additional restrictions:          Primary Diagnosis     Your primary diagnosis was:  Lower Intestinal Bleeding      Admission Information     Date & Time Provider Department CSN    5/5/2017  2:39 PM Niko Hernandez MD [facility] -  [id number]      Care Providers     Provider Role Specialty Primary office phone    Niko Hernandez MD Attending Provider Internal Medicine [phone]    Niko Hernandez MD Team Attending  Internal Medicine [phone]    Brennon Gomes III, MD Consulting Physician  Gastroenterology [phone]    Brennon Gomes III, MD Surgeon  Gastroenterology [phone]      Your Vitals Were     BP Pulse Temp Resp Height Weight    134/74 (BP Location: Left arm, Patient Position: Lying, BP Method: Automatic) 81 98.2 °F (36.8 °C) 18 5' 6" (1.676 m) 82.2 kg (181 lb 5 oz)    SpO2 BMI             99% 29.26 kg/m2         Recent Lab Values     No lab values to display.      Pending Labs     Order Current Status    Ferritin In process    Type & Screen In process    Prepare RBC 2 Units; rectal bleeding Preliminary result    Prepare RBC 2 Units; symptomatic anemia Preliminary result      Allergies as of 5/7/2017     No Known Allergies      [facility]     [facility] Nurse Care Line - 24/7 Assistance  Unless otherwise directed by your provider, please contact [facility], our nurse care line that is available for 24/7 assistance.     Registered nurses in the [facility] provide clinical advisement, health education, appointment booking, and other advisory services.  Call for this free service at [phone].        Advance Directives     An advance directive is a document which, in the event you are no longer able to make decisions for yourself, tells your healthcare team what kind of treatment you do or do not want to receive, or who you would like to make those decisions for you.  If you do not currently have an advance directive, [facility] encourages you to create one.  For more information call:  [phone]), [phone] ([phone]),  or log on to [email or web address].        Language Assistance Services     ATTENTION: Language assistance services are available, free of charge. Please call [phone].      ATENCIÓN: Si habla español, tiene a pelletier disposición servicios gratuitos de asistencia lingüística. Llame al [phone].     CHÚ Ý: N?u b?n nói Ti?ng Vi?t, có các d?ch v? h? tr? ngôn ng? mi?n phí dành cho b?n. G?i s? [phone].        Blood Transfusion Reaction Signs and Symptoms     The blood you have received has been matched for you as carefully as possible. Most patients who receive a blood transfusion do not experience problems. However, there can be a delayed reaction that happens a few weeks after your blood transfusion. Contact your physician immediately if you experience any NEW SYMPTOMS listed below:     Fever greater than 100.4 degrees    Chills   Yellow color to your skin or eyes(Jaundice)   Back pain, chest pain, or pain at the infusion site   Weakness (more than usual)   Discomfort or uneasiness more than usual (Malaise)   Nausea or vomiting   Shortness of breath, wheezing, or coughing   Higher or lower blood pressure than normal   Skin rash, itching, skin redness, or localized swelling (example:  hands or feet)   Urinating less than normal   Urine appears reddish or orange and is darker than normal      Remember that some these signs may already exist for you--such as having chronic back pain or high blood pressure. You only need to look for and report to your doctor any new occurrences since your blood transfusion that are of concern.        [facility] Sign-Up     Activating your [facility] account is as easy as 1-2-3!     1) Visit [email or web address], select Sign Up Now, enter this activation code and your date of birth, then select Next.  [email or web address]  Expires: 6/21/2017  5:46 PM      2) Create a username and password to use when you visit [facility] in the future and select a security question in case you lose your password and select Next.    3) Enter your e-mail address and click Sign Up!    Additional Information  If you have questions, please e-mail [email or web address] or call [phone] to talk to our [facility] staff. Remember, [facility] is NOT to be used for urgent needs. For medical emergencies, dial 911.          [facility] complies with applicable Federal civil rights laws and does not discriminate on the basis of race, color, national origin, age, disability, or sex.

## 2017-05-06 ENCOUNTER — ANESTHESIA EVENT (OUTPATIENT)
Dept: ENDOSCOPY | Facility: HOSPITAL | Age: 42
DRG: 379 | End: 2017-05-06
Payer: COMMERCIAL

## 2017-05-06 ENCOUNTER — SURGERY (OUTPATIENT)
Age: 42
End: 2017-05-06

## 2017-05-06 ENCOUNTER — ANESTHESIA (OUTPATIENT)
Dept: ENDOSCOPY | Facility: HOSPITAL | Age: 42
DRG: 379 | End: 2017-05-06
Payer: COMMERCIAL

## 2017-05-06 LAB
ABO GROUP BLD: NORMAL
ALBUMIN SERPL BCP-MCNC: 3.7 G/DL
ALP SERPL-CCNC: 87 U/L
ALT SERPL W/O P-5'-P-CCNC: 27 U/L
ANION GAP SERPL CALC-SCNC: 12 MMOL/L
ANISOCYTOSIS BLD QL SMEAR: ABNORMAL
AST SERPL-CCNC: 25 U/L
BASOPHILS # BLD AUTO: 0.05 K/UL
BASOPHILS NFR BLD: 0.8 %
BILIRUB SERPL-MCNC: 0.8 MG/DL
BLD GP AB SCN CELLS X3 SERPL QL: NORMAL
BLD PROD TYP BPU: NORMAL
BLOOD UNIT EXPIRATION DATE: NORMAL
BLOOD UNIT TYPE CODE: 6200
BLOOD UNIT TYPE: NORMAL
BUN SERPL-MCNC: 11 MG/DL
CALCIUM SERPL-MCNC: 8.6 MG/DL
CHLORIDE SERPL-SCNC: 107 MMOL/L
CO2 SERPL-SCNC: 23 MMOL/L
CODING SYSTEM: NORMAL
CREAT SERPL-MCNC: 0.8 MG/DL
DIFFERENTIAL METHOD: ABNORMAL
DISPENSE STATUS: NORMAL
EOSINOPHIL # BLD AUTO: 0.1 K/UL
EOSINOPHIL NFR BLD: 2.1 %
ERYTHROCYTE [DISTWIDTH] IN BLOOD BY AUTOMATED COUNT: 19 %
EST. GFR  (AFRICAN AMERICAN): >60 ML/MIN/1.73 M^2
EST. GFR  (NON AFRICAN AMERICAN): >60 ML/MIN/1.73 M^2
GLUCOSE SERPL-MCNC: 96 MG/DL
HCT VFR BLD AUTO: 25.5 %
HGB BLD-MCNC: 7.8 G/DL
LYMPHOCYTES # BLD AUTO: 1.9 K/UL
LYMPHOCYTES NFR BLD: 31.1 %
MCH RBC QN AUTO: 21.9 PG
MCHC RBC AUTO-ENTMCNC: 30.6 %
MCV RBC AUTO: 72 FL
MONOCYTES # BLD AUTO: 0.6 K/UL
MONOCYTES NFR BLD: 9.5 %
NEUTROPHILS # BLD AUTO: 3.4 K/UL
NEUTROPHILS NFR BLD: 56.7 %
NUM UNITS TRANS PACKED RBC: NORMAL
PLATELET # BLD AUTO: 329 K/UL
PLATELET BLD QL SMEAR: ABNORMAL
PMV BLD AUTO: 8.4 FL
POIKILOCYTOSIS BLD QL SMEAR: ABNORMAL
POLYCHROMASIA BLD QL SMEAR: ABNORMAL
POTASSIUM SERPL-SCNC: 3.1 MMOL/L
PROT SERPL-MCNC: 7.2 G/DL
RBC # BLD AUTO: 3.56 M/UL
RH BLD: NORMAL
SODIUM SERPL-SCNC: 142 MMOL/L
SPHEROCYTES BLD QL SMEAR: ABNORMAL
STOMATOCYTES BLD QL SMEAR: PRESENT
WBC # BLD AUTO: 6.1 K/UL

## 2017-05-06 PROCEDURE — 37000009 HC ANESTHESIA EA ADD 15 MINS: Performed by: INTERNAL MEDICINE

## 2017-05-06 PROCEDURE — 80053 COMPREHEN METABOLIC PANEL: CPT

## 2017-05-06 PROCEDURE — 99223 1ST HOSP IP/OBS HIGH 75: CPT | Mod: ,,, | Performed by: INTERNAL MEDICINE

## 2017-05-06 PROCEDURE — 63600175 PHARM REV CODE 636 W HCPCS: Performed by: NURSE PRACTITIONER

## 2017-05-06 PROCEDURE — C9113 INJ PANTOPRAZOLE SODIUM, VIA: HCPCS | Performed by: NURSE PRACTITIONER

## 2017-05-06 PROCEDURE — 82728 ASSAY OF FERRITIN: CPT

## 2017-05-06 PROCEDURE — 25000003 PHARM REV CODE 250: Performed by: NURSE PRACTITIONER

## 2017-05-06 PROCEDURE — P9016 RBC LEUKOCYTES REDUCED: HCPCS

## 2017-05-06 PROCEDURE — 21400001 HC TELEMETRY ROOM

## 2017-05-06 PROCEDURE — 45378 DIAGNOSTIC COLONOSCOPY: CPT | Performed by: INTERNAL MEDICINE

## 2017-05-06 PROCEDURE — 85025 COMPLETE CBC W/AUTO DIFF WBC: CPT

## 2017-05-06 PROCEDURE — 25000003 PHARM REV CODE 250: Performed by: NURSE ANESTHETIST, CERTIFIED REGISTERED

## 2017-05-06 PROCEDURE — 37000008 HC ANESTHESIA 1ST 15 MINUTES: Performed by: INTERNAL MEDICINE

## 2017-05-06 PROCEDURE — 83540 ASSAY OF IRON: CPT

## 2017-05-06 PROCEDURE — 25000003 PHARM REV CODE 250: Performed by: INTERNAL MEDICINE

## 2017-05-06 PROCEDURE — 45378 DIAGNOSTIC COLONOSCOPY: CPT | Mod: ,,, | Performed by: INTERNAL MEDICINE

## 2017-05-06 PROCEDURE — 63600175 PHARM REV CODE 636 W HCPCS: Performed by: NURSE ANESTHETIST, CERTIFIED REGISTERED

## 2017-05-06 PROCEDURE — 36430 TRANSFUSION BLD/BLD COMPNT: CPT

## 2017-05-06 PROCEDURE — 0DJD8ZZ INSPECTION OF LOWER INTESTINAL TRACT, VIA NATURAL OR ARTIFICIAL OPENING ENDOSCOPIC: ICD-10-PCS | Performed by: INTERNAL MEDICINE

## 2017-05-06 PROCEDURE — 36415 COLL VENOUS BLD VENIPUNCTURE: CPT

## 2017-05-06 RX ORDER — SODIUM CHLORIDE 9 MG/ML
INJECTION, SOLUTION INTRAVENOUS CONTINUOUS PRN
Status: DISCONTINUED | OUTPATIENT
Start: 2017-05-06 | End: 2017-05-06

## 2017-05-06 RX ORDER — PROPOFOL 10 MG/ML
VIAL (ML) INTRAVENOUS
Status: DISCONTINUED | OUTPATIENT
Start: 2017-05-06 | End: 2017-05-06

## 2017-05-06 RX ORDER — HYDROCODONE BITARTRATE AND ACETAMINOPHEN 500; 5 MG/1; MG/1
TABLET ORAL
Status: DISCONTINUED | OUTPATIENT
Start: 2017-05-06 | End: 2017-05-07 | Stop reason: HOSPADM

## 2017-05-06 RX ORDER — PROPRANOLOL HYDROCHLORIDE 80 MG/1
80 CAPSULE, EXTENDED RELEASE ORAL NIGHTLY
Status: DISCONTINUED | OUTPATIENT
Start: 2017-05-06 | End: 2017-05-07 | Stop reason: HOSPADM

## 2017-05-06 RX ORDER — POTASSIUM CHLORIDE 20 MEQ/1
40 TABLET, EXTENDED RELEASE ORAL ONCE
Status: COMPLETED | OUTPATIENT
Start: 2017-05-06 | End: 2017-05-06

## 2017-05-06 RX ORDER — LIDOCAINE HYDROCHLORIDE 20 MG/ML
INJECTION, SOLUTION EPIDURAL; INFILTRATION; INTRACAUDAL; PERINEURAL
Status: DISCONTINUED | OUTPATIENT
Start: 2017-05-06 | End: 2017-05-06

## 2017-05-06 RX ADMIN — PROPOFOL 40 MG: 10 INJECTION, EMULSION INTRAVENOUS at 11:05

## 2017-05-06 RX ADMIN — PROPOFOL 60 MG: 10 INJECTION, EMULSION INTRAVENOUS at 11:05

## 2017-05-06 RX ADMIN — PANTOPRAZOLE SODIUM 40 MG: 40 INJECTION, POWDER, FOR SOLUTION INTRAVENOUS at 08:05

## 2017-05-06 RX ADMIN — SODIUM CHLORIDE: 0.9 INJECTION, SOLUTION INTRAVENOUS at 10:05

## 2017-05-06 RX ADMIN — PROPRANOLOL HYDROCHLORIDE 80 MG: 80 CAPSULE, EXTENDED RELEASE ORAL at 08:05

## 2017-05-06 RX ADMIN — LIDOCAINE HYDROCHLORIDE 40 MG: 20 INJECTION, SOLUTION EPIDURAL; INFILTRATION; INTRACAUDAL; PERINEURAL at 11:05

## 2017-05-06 RX ADMIN — POTASSIUM CHLORIDE 40 MEQ: 1500 TABLET, EXTENDED RELEASE ORAL at 03:05

## 2017-05-06 RX ADMIN — PANTOPRAZOLE SODIUM 40 MG: 40 INJECTION, POWDER, FOR SOLUTION INTRAVENOUS at 10:05

## 2017-05-06 RX ADMIN — ATORVASTATIN CALCIUM 10 MG: 10 TABLET, FILM COATED ORAL at 10:05

## 2017-05-06 NOTE — SUBJECTIVE & OBJECTIVE
Interval History: Pt seen and examined. Pt has no complaints at this time.     Review of Systems   Constitutional: Positive for activity change. Negative for appetite change, chills, fatigue and fever.   HENT: Negative for tinnitus and trouble swallowing.    Eyes: Negative.    Respiratory: Positive for cough. Negative for apnea, choking, chest tightness, shortness of breath, wheezing and stridor.    Cardiovascular: Negative for chest pain, palpitations and leg swelling.   Gastrointestinal: Positive for blood in stool. Negative for abdominal pain, diarrhea, nausea and vomiting.   Endocrine: Negative.    Genitourinary: Negative.    Musculoskeletal: Negative.    Skin: Positive for pallor.   Allergic/Immunologic: Negative.    Neurological: Negative for dizziness, tremors, seizures, syncope, facial asymmetry, speech difficulty, weakness, light-headedness, numbness and headaches.   Hematological: Negative.      Objective:     Vital Signs (Most Recent):  Temp: 99.1 °F (37.3 °C) (05/06/17 1440)  Pulse: 98 (05/06/17 1440)  Resp: 18 (05/06/17 1440)  BP: 119/72 (05/06/17 1440)  SpO2: 100 % (05/06/17 1440) Vital Signs (24h Range):  Temp:  [97.8 °F (36.6 °C)-99.1 °F (37.3 °C)] 99.1 °F (37.3 °C)  Pulse:  [] 98  Resp:  [18-29] 18  SpO2:  [95 %-100 %] 100 %  BP: ()/(47-87) 119/72     Weight: 82.2 kg (181 lb 5 oz)  Body mass index is 29.26 kg/(m^2).    Intake/Output Summary (Last 24 hours) at 05/06/17 1521  Last data filed at 05/06/17 1425   Gross per 24 hour   Intake          1385.42 ml   Output                0 ml   Net          1385.42 ml      Physical Exam   Constitutional: He is oriented to person, place, and time. He appears well-developed.   HENT:   Head: Normocephalic and atraumatic.   Eyes: Conjunctivae and EOM are normal.   Pale conjunctiva   Neck: Normal range of motion. Neck supple.   Cardiovascular: Normal rate, regular rhythm, normal heart sounds and intact distal pulses.    Pulmonary/Chest: Effort normal  and breath sounds normal. No respiratory distress.   Abdominal: Soft. Bowel sounds are normal. There is no tenderness.   Musculoskeletal: Normal range of motion.   Neurological: He is alert and oriented to person, place, and time.   Skin: Skin is warm and dry.   Psychiatric: He has a normal mood and affect. His behavior is normal. Judgment and thought content normal.   Vitals reviewed.      Significant Labs:   CBC:   Recent Labs  Lab 05/05/17  1645 05/06/17  0909   WBC 8.35 6.10   HGB 6.5* 7.8*   HCT 20.3* 25.5*   * 329     CMP:   Recent Labs  Lab 05/05/17  1645 05/06/17  0909    142   K 3.2* 3.1*    107   CO2 22* 23   * 96   BUN 23* 11   CREATININE 1.1 0.8   CALCIUM 9.2 8.6*   PROT 8.9* 7.2   ALBUMIN 4.0 3.7   BILITOT 0.3 0.8   ALKPHOS 99 87   AST 26 25   ALT 24 27   ANIONGAP 13 12   EGFRNONAA >60 >60       Significant Imaging:   Imaging Results         NM GI Bleed Study (Final result) Result time:  05/05/17 22:15:07    Final result by Zhang Car MD (05/05/17 22:15:07)    Impression:       1.  Over the 1st 55 minutes, no definite evidence for GI bleed, however, progressive uptake in the urinary bladder, likely representing free technetium, could obscure a very distal rectal or anal bleed.  2.  On the 60 minute image, there was a similar distribution of the radiopharmaceutical.  3.  At no time, was there evidence for abnormal uptake involving the small bowel or the vast majority of the colon.         Electronically signed by: ZHANG CAR MD  Date:     05/05/17  Time:    22:15     Narrative:    Nuclear medicine GI bleeding scan    Clinical Indication:    Lower GI bleeding with bright red blood per rectum.      Technique: 25 mCi of technetium 99m labeled tagged red blood cells was injected in the patient with imaging obtained for one hour.     Findings:  No comparison studies are available.  There was progressive activity centered in the inferior pelvis, likely representing the urinary  bladder.  No other abnormal uptake was seen over the 1st 55 minutes of the study.  At the 55 minute daniele, the patient needs to urinate.  The patient went to the restroom, and urinated, and also described bright red blood per rectum.  A 60 minute image was obtained, demonstrating a similar appearance to the distribution of the radiopharmaceutical.            X-Ray Chest PA And Lateral (Final result) Result time:  05/05/17 16:56:57    Final result by Ignacio Fried III, MD (05/05/17 16:56:57)    Impression:         Negative two-view chest x-ray.      Electronically signed by: IGNACIO FRIED MD  Date:     05/05/17  Time:    16:56     Narrative:    Two-view chest x-ray.    Clinical indication: coughwith    Heart size is normal. The lung fields are clear. No acute pulmonary infiltrate.

## 2017-05-06 NOTE — PLAN OF CARE
Problem: Patient Care Overview  Goal: Plan of Care Review  Outcome: Ongoing (interventions implemented as appropriate)  Patient AAO. Tachycardic and SOB with ambulation.  Patient reports having bloody stools.  At this time has only drank half of the Philipp for the colonoscopy prep and states stools are clear already.   IVF administered as ordered. One of two units of blood transfusing at this time. No complications noted so far.  Patient has ongoing cough  Since January now with intermittent chest discomfort rated at 6/10.  Pt declines pain medication at this time.  Chart review for 24 hours completed. Will continue to monitor till discharge.

## 2017-05-06 NOTE — ASSESSMENT & PLAN NOTE
- Remains borderline hypotensive -- orthostatic -- Hold Amlodipine and HCTZ  - IVF  - Resume BB with parameters -- Hold BB if systolic BP < 110 mmHg and/or HR < 60 BPM

## 2017-05-06 NOTE — SUBJECTIVE & OBJECTIVE
Past Medical History:   Diagnosis Date    Asthma     Colon polyps     Hiatal hernia     High cholesterol     Hypertension     Macular degeneration     RVOT ventricular tachycardia        Past Surgical History:   Procedure Laterality Date    APPENDECTOMY         Review of patient's allergies indicates:  No Known Allergies  Family History     Problem Relation (Age of Onset)    Alzheimer's disease Paternal Grandfather    Asthma Mother    Cancer Maternal Grandmother    Hypertension Father        Social History Main Topics    Smoking status: Never Smoker    Smokeless tobacco: Not on file    Alcohol use 0.6 oz/week     1 Glasses of wine per week    Drug use: No    Sexual activity: No     Review of Systems   Constitutional: Positive for fatigue. Negative for activity change, appetite change, chills, diaphoresis, fever and unexpected weight change.   HENT: Positive for sneezing. Negative for congestion, ear discharge, facial swelling, hearing loss, nosebleeds, postnasal drip, sinus pressure, tinnitus, trouble swallowing and voice change.    Eyes: Negative for photophobia, redness and visual disturbance.   Respiratory: Positive for cough, chest tightness and wheezing. Negative for shortness of breath.    Cardiovascular: Negative for chest pain and palpitations.   Gastrointestinal: Positive for blood in stool. Negative for abdominal distention, abdominal pain, constipation, diarrhea, nausea, rectal pain and vomiting.   Genitourinary: Negative for difficulty urinating, discharge, dysuria, flank pain, frequency, hematuria, scrotal swelling, testicular pain and urgency.   Musculoskeletal: Negative for arthralgias, back pain, gait problem, joint swelling, myalgias and neck stiffness.   Skin: Positive for pallor. Negative for color change, rash and wound.   Neurological: Positive for weakness and light-headedness. Negative for dizziness, tremors, seizures, syncope, facial asymmetry, speech difficulty, numbness and  headaches.   Hematological: Negative for adenopathy. Does not bruise/bleed easily.   Psychiatric/Behavioral: Negative for agitation, confusion, hallucinations, sleep disturbance and suicidal ideas.     Objective:     Vital Signs (Most Recent):  Temp: 98.2 °F (36.8 °C) (05/06/17 1120)  Pulse: 97 (05/06/17 1150)  Resp: 18 (05/06/17 1150)  BP: 111/60 (05/06/17 1150)  SpO2: 96 % (05/06/17 1150) Vital Signs (24h Range):  Temp:  [97.8 °F (36.6 °C)-98.9 °F (37.2 °C)] 98.2 °F (36.8 °C)  Pulse:  [] 97  Resp:  [16-29] 18  SpO2:  [95 %-100 %] 96 %  BP: ()/(47-87) 111/60     Weight: 82.2 kg (181 lb 5 oz) (05/06/17 0145)  Body mass index is 29.26 kg/(m^2).      Intake/Output Summary (Last 24 hours) at 05/06/17 1158  Last data filed at 05/06/17 0830   Gross per 24 hour   Intake          1035.42 ml   Output                0 ml   Net          1035.42 ml       Lines/Drains/Airways     Peripheral Intravenous Line                 Peripheral IV - Single Lumen 05/05/17 1645 Left Antecubital less than 1 day                Physical Exam   Constitutional: He is oriented to person, place, and time. He appears well-developed and well-nourished. No distress.   HENT:   Head: Normocephalic and atraumatic.   Nose: Nose normal.   Mouth/Throat: Oropharynx is clear and moist. No oropharyngeal exudate.   Eyes: Conjunctivae are normal. Pupils are equal, round, and reactive to light. No scleral icterus.   Conjunctivae slightly pale   Neck: Normal range of motion. Neck supple. No thyromegaly present.   Cardiovascular: Normal rate and regular rhythm.  Exam reveals no gallop and no friction rub.    No murmur heard.  Pulmonary/Chest: Effort normal and breath sounds normal. No respiratory distress. He has no wheezes. He has no rales.   Abdominal: Soft. Bowel sounds are normal. He exhibits no distension and no mass. There is no tenderness. There is no rebound and no guarding.   Musculoskeletal: He exhibits no edema or tenderness.    Lymphadenopathy:     He has no cervical adenopathy.   Neurological: He is alert and oriented to person, place, and time. He exhibits normal muscle tone. Coordination normal.   Skin: Skin is warm. No rash noted. He is not diaphoretic.   Psychiatric: He has a normal mood and affect. His behavior is normal. Judgment and thought content normal.   Vitals reviewed.      Significant Labs:  All pertinent lab results from the last 24 hours have been reviewed.    Significant Imaging:  Imaging results within the past 24 hours have been reviewed.

## 2017-05-06 NOTE — ASSESSMENT & PLAN NOTE
He received potassium yesterday, but level is continued low. He is still in acceptable range for anesthesia to allow for us to proceed with colonoscopy but will need continued replacement. Will notify hospital medicine.

## 2017-05-06 NOTE — ANESTHESIA RELEASE NOTE
"Anesthesia Release from PACU Note    Patient: Doug Arauz    Procedure(s) Performed: Procedure(s) (LRB):  COLONOSCOPY (N/A)    Anesthesia type: MAC    Post pain: Adequate analgesia    Post assessment: no apparent anesthetic complications and tolerated procedure well    Last Vitals:   Visit Vitals    BP (!) 84/47 (BP Location: Right arm, Patient Position: Lying, BP Method: Automatic)    Pulse 93    Temp 36.8 °C (98.2 °F) (Oral)    Resp 20    Ht 5' 6" (1.676 m)    Wt 82.2 kg (181 lb 5 oz)    SpO2 97%    BMI 29.26 kg/m2       Post vital signs: stable    Level of consciousness: awake, alert  and oriented    Nausea/Vomiting: no nausea/no vomiting    Complications: none    Airway Patency: patent    Respiratory: unassisted    Cardiovascular: stable and blood pressure at baseline    Hydration: euvolemic  "

## 2017-05-06 NOTE — TRANSFER OF CARE
"Anesthesia Transfer of Care Note    Patient: Doug Arauz    Procedure(s) Performed: Procedure(s) (LRB):  COLONOSCOPY (N/A)    Patient location: GI    Anesthesia Type: MAC    Transport from OR: Transported from OR on room air with adequate spontaneous ventilation    Post pain: adequate analgesia    Post assessment: no apparent anesthetic complications and tolerated procedure well    Post vital signs: stable    Level of consciousness: responds to stimulation    Nausea/Vomiting: no nausea/vomiting    Complications: none    Transfer of care protocol was followed      Last vitals:   Visit Vitals    BP (!) 84/47 (BP Location: Right arm, Patient Position: Lying, BP Method: Automatic)    Pulse 93    Temp 36.8 °C (98.2 °F) (Oral)    Resp 20    Ht 5' 6" (1.676 m)    Wt 82.2 kg (181 lb 5 oz)    SpO2 97%    BMI 29.26 kg/m2     "

## 2017-05-06 NOTE — H&P
Short Stay Endoscopy History and Physical    PCP - Niko Camacho MD    Procedure - Colonoscopy  ASA - 3  Mallampati - per anesthesia  History of Anesthesia problems - no  Family history Anesthesia problems -  no     HPI:  This is a 42 y.o.male here for evaluation of : Lower GI bleed    Reflux - no  Dysphagia - no  Abdominal pain - no  Diarrhea - no  Anemia - yes  GI bleeding - yes  Nausea and vomiting-yes  Early satiety-no  aversion to sight or smell of food-no    ROS:  Constitutional: No fevers, chills, No weight loss  ENT: No allergies  CV: No chest pain  Pulm: No cough, No shortness of breath  Ophtho: No vision changes  GI: see HPI  Derm: No rash  Heme: No lymphadenopathy, No bruising  MSK: No arthritis  : No dysuria, No hematuria  Endo: No hot or cold intolerance  Neuro: No syncope, No seizure  Psych: No anxiety, No depression    Medical History:  Past Medical History:   Diagnosis Date    Asthma     Colon polyps     Hiatal hernia     High cholesterol     Hypertension     Macular degeneration     RVOT ventricular tachycardia        Surgical History:  Past Surgical History:   Procedure Laterality Date    APPENDECTOMY         Family History:  Family History   Problem Relation Age of Onset    Asthma Mother     Hypertension Father     Cancer Maternal Grandmother     Alzheimer's disease Paternal Grandfather        Social History:  Social History     Social History    Marital status: Single     Spouse name: N/A    Number of children: N/A    Years of education: N/A     Occupational History    Not on file.     Social History Main Topics    Smoking status: Never Smoker    Smokeless tobacco: Not on file    Alcohol use 0.6 oz/week     1 Glasses of wine per week    Drug use: No    Sexual activity: No     Other Topics Concern    Not on file     Social History Narrative       Allergies: Review of patient's allergies indicates:  No Known Allergies    Medications:   No current facility-administered  medications on file prior to encounter.      No current outpatient prescriptions on file prior to encounter.       Objective Findings:    Vital Signs:  Vitals:    05/06/17 1042   BP: 119/70   Pulse: 93   Resp: 18   Temp: 98.1 °F (36.7 °C)           Physical Exam:  General Appearance: Well appearing in no acute distress  Eyes:    No scleral icterus  ENT: Neck supple, Lips, mucosa, and tongue normal; teeth and gums normal  Lungs: CTA bilaterally in anterior and posterior fields, no wheezes, no crackles.  Heart:  Regular rate, S1, S2 normal, no murmurs heard.  Abdomen: Soft, non tender, non distended with normal bowel sounds. No hepatosplenomegaly, ascites, or mass.  Extremities: No clubbing, cyanosis or edema  Skin: No rash    Labs:  Reviewed    Plan:Colonoscopy  I have explained the risks and benefits of endoscopy procedures to the patient including but not limited to bleeding, perforation, infection, and death. The patient wishes to proceed.

## 2017-05-06 NOTE — ANESTHESIA PREPROCEDURE EVALUATION
05/06/2017  Doug Arauz is a 42 y.o., male.    Anesthesia Evaluation    I have reviewed the Patient Summary Reports.    I have reviewed the Nursing Notes.   I have reviewed the Medications.     Review of Systems  Anesthesia Hx:  No problems with previous Anesthesia    Social:  Non-Smoker, No Alcohol Use    Hematology/Oncology:  Hematology Normal   Oncology Normal     EENT/Dental:EENT/Dental Normal   Cardiovascular:   Exercise tolerance: good Hypertension, well controlled Dysrhythmias    Pulmonary:   Asthma asymptomatic    Renal/:  Renal/ Normal     Hepatic/GI:   Hiatal Hernia,    Musculoskeletal:  Musculoskeletal Normal    Neurological:  Neurology Normal    Endocrine:  Endocrine Normal    Dermatological:  Skin Normal    Psych:  Psychiatric Normal           Physical Exam  General:  Well nourished    Airway/Jaw/Neck:  Airway Findings: Mouth Opening: Normal Tongue: Normal  General Airway Assessment: Adult  Mallampati: II  TM Distance: Normal, at least 6 cm  Jaw/Neck Findings:  Neck ROM: Normal ROM      Dental:  Dental Findings: In tact    Chest/Lungs:  Chest/Lungs Findings: Clear to auscultation, Normal Respiratory Rate     Heart/Vascular:  Heart Findings: Rate: Normal  Rhythm: Regular Rhythm  Sounds: Normal        Mental Status:  Mental Status Findings:  Cooperative, Alert and Oriented         Anesthesia Plan  Type of Anesthesia, risks & benefits discussed:  Anesthesia Type:  MAC  Patient's Preference:   Intra-op Monitoring Plan:   Intra-op Monitoring Plan Comments:   Post Op Pain Control Plan:   Post Op Pain Control Plan Comments:   Induction:   IV  Beta Blocker:  Patient is on a Beta-Blocker and has received one dose within the past 24 hours (No further documentation required).       Informed Consent: Patient understands risks and agrees with Anesthesia plan.  Questions answered. Anesthesia consent  signed with patient.  ASA Score: 2  emergent   Day of Surgery Review of History & Physical: I have interviewed and examined the patient. I have reviewed the patient's H&P dated: 05/06/2017. There are no significant changes.          Ready For Surgery From Anesthesia Perspective.

## 2017-05-06 NOTE — ED NOTES
Received report from April, RN Pt. In NAD, VSS, RR equal and unlabored. Pt awaiting bed assignment. Pt's bed is low, locked, and call light in reach. SR up X2. Will continue to monitor pt.

## 2017-05-06 NOTE — ASSESSMENT & PLAN NOTE
The patient has been prepped over night. He has been evaluated in the Endoscopy unit. He received transfusion of 2 units and will likel be given at least one more unit. Colonoscopy today.

## 2017-05-06 NOTE — NURSING
Called Lab to check on blood.  Lab states no type and screen has been done yet. Called Madeline in ER.  She will check around ER to see if blood was collected and not delivered to lab.  Patient has a blood band on.

## 2017-05-06 NOTE — PROGRESS NOTES
"[facility] Hospital Medicine  Progress Note    Patient Name: Doug Arauz  MRN: [id number]  Patient Class: IP- Inpatient   Admission Date: 5/5/2017  Length of Stay: 1 days  Attending Physician: Niko Hernandez MD  Primary Care Provider: Niko Camacho MD        Subjective:     Principal Problem:Lower GI bleed    HPI:  Doug Arauz is a 42 year old male with a PMHx of HTN, HLD, Asthma, and Hiatal hernia who presented to the Emergency Department with c/o blood in stool x 4 hours. Pt states going to the bathroom and "1 gallon of blood came out." ED workup revealed: Hgb/Hct 6.5/20.3, K+ 3.2, BUN 23. ED discussed case with GI who recommending bleeding scan, clear liquids overnight, and possible colonoscopy tomorrow. Pt admitted to Telemetry for GI Bleed.     Hospital Course:  Doug Arauz is a 42 year old male admitted for GI Bleed. Pt was transfused overnight with 2 units of PRBCs upon admission due to Hgb/Hct of 6.5/20.3. Hgb/Hct improved to 7.8/25.5 on AM labs. Pt underwent colonoscopy performed by Dr. Gomes (GI) today, 05/06/17. Colonoscopy found diverticulosis, no source of bleed. May need video capsule as outpatient. Dr. Gomes recommends transfusing 2 units of PRBCs post colonoscopy and monitor overnight.     Interval History: Pt seen and examined. Pt has no complaints at this time.     Review of Systems   Constitutional: Positive for activity change. Negative for appetite change, chills, fatigue and fever.   HENT: Negative for tinnitus and trouble swallowing.    Eyes: Negative.    Respiratory: Positive for cough. Negative for apnea, choking, chest tightness, shortness of breath, wheezing and stridor.    Cardiovascular: Negative for chest pain, palpitations and leg swelling.   Gastrointestinal: Positive for blood in stool. Negative for abdominal pain, diarrhea, nausea and vomiting.   Endocrine: Negative.    Genitourinary: Negative.    Musculoskeletal: Negative.    Skin: Positive for " pallor.   Allergic/Immunologic: Negative.    Neurological: Negative for dizziness, tremors, seizures, syncope, facial asymmetry, speech difficulty, weakness, light-headedness, numbness and headaches.   Hematological: Negative.      Objective:     Vital Signs (Most Recent):  Temp: 99.1 °F (37.3 °C) (05/06/17 1440)  Pulse: 98 (05/06/17 1440)  Resp: 18 (05/06/17 1440)  BP: 119/72 (05/06/17 1440)  SpO2: 100 % (05/06/17 1440) Vital Signs (24h Range):  Temp:  [97.8 °F (36.6 °C)-99.1 °F (37.3 °C)] 99.1 °F (37.3 °C)  Pulse:  [] 98  Resp:  [18-29] 18  SpO2:  [95 %-100 %] 100 %  BP: ()/(47-87) 119/72     Weight: 82.2 kg (181 lb 5 oz)  Body mass index is 29.26 kg/(m^2).    Intake/Output Summary (Last 24 hours) at 05/06/17 1521  Last data filed at 05/06/17 1425   Gross per 24 hour   Intake          1385.42 ml   Output                0 ml   Net          1385.42 ml      Physical Exam   Constitutional: He is oriented to person, place, and time. He appears well-developed.   HENT:   Head: Normocephalic and atraumatic.   Eyes: Conjunctivae and EOM are normal.   Pale conjunctiva   Neck: Normal range of motion. Neck supple.   Cardiovascular: Normal rate, regular rhythm, normal heart sounds and intact distal pulses.    Pulmonary/Chest: Effort normal and breath sounds normal. No respiratory distress.   Abdominal: Soft. Bowel sounds are normal. There is no tenderness.   Musculoskeletal: Normal range of motion.   Neurological: He is alert and oriented to person, place, and time.   Skin: Skin is warm and dry.   Psychiatric: He has a normal mood and affect. His behavior is normal. Judgment and thought content normal.   Vitals reviewed.      Significant Labs:   CBC:   Recent Labs  Lab 05/05/17  1645 05/06/17  0909   WBC 8.35 6.10   HGB 6.5* 7.8*   HCT 20.3* 25.5*   * 329     CMP:   Recent Labs  Lab 05/05/17  1645 05/06/17  0909    142   K 3.2* 3.1*    107   CO2 22* 23   * 96   BUN 23* 11   CREATININE 1.1  0.8   CALCIUM 9.2 8.6*   PROT 8.9* 7.2   ALBUMIN 4.0 3.7   BILITOT 0.3 0.8   ALKPHOS 99 87   AST 26 25   ALT 24 27   ANIONGAP 13 12   EGFRNONAA >60 >60       Significant Imaging:   Imaging Results         NM GI Bleed Study (Final result) Result time:  05/05/17 22:15:07    Final result by Zhang Car MD (05/05/17 22:15:07)    Impression:       1.  Over the 1st 55 minutes, no definite evidence for GI bleed, however, progressive uptake in the urinary bladder, likely representing free technetium, could obscure a very distal rectal or anal bleed.  2.  On the 60 minute image, there was a similar distribution of the radiopharmaceutical.  3.  At no time, was there evidence for abnormal uptake involving the small bowel or the vast majority of the colon.         Electronically signed by: ZHANG CAR MD  Date:     05/05/17  Time:    22:15     Narrative:    Nuclear medicine GI bleeding scan    Clinical Indication:    Lower GI bleeding with bright red blood per rectum.      Technique: 25 mCi of technetium 99m labeled tagged red blood cells was injected in the patient with imaging obtained for one hour.     Findings:  No comparison studies are available.  There was progressive activity centered in the inferior pelvis, likely representing the urinary bladder.  No other abnormal uptake was seen over the 1st 55 minutes of the study.  At the 55 minute daniele, the patient needs to urinate.  The patient went to the restroom, and urinated, and also described bright red blood per rectum.  A 60 minute image was obtained, demonstrating a similar appearance to the distribution of the radiopharmaceutical.            X-Ray Chest PA And Lateral (Final result) Result time:  05/05/17 16:56:57    Final result by Ramón Avila III, MD (05/05/17 16:56:57)    Impression:         Negative two-view chest x-ray.      Electronically signed by: RAMÓN AVILA MD  Date:     05/05/17  Time:    16:56     Narrative:    Two-view chest  x-ray.    Clinical indication: coughwith    Heart size is normal. The lung fields are clear. No acute pulmonary infiltrate.            Assessment/Plan:      * Lower GI bleed  - Gastroenterology following  - Transfused with 2 units of PRBCs upon admission -- Hgb/Hct 6.5/20.3  - Colonoscopy today -- diverticulosis, no source of bleeding found   - Transfuse 2 units of PRBCs post colonoscopy  - Continue PPI BID  - Supplemental oxygen prn, keep O2 sats > 92%  - May need video capsule outpatient  - Will monitor overnight and possible discharge in AM      Hypokalemia  - To replete  - Repeat BMP in AM      HTN (hypertension)  - Remains borderline hypotensive -- orthostatic -- Hold Amlodipine and HCTZ  - IVF  - Resume BB with parameters -- Hold BB if systolic BP < 110 mmHg and/or HR < 60 BPM      HLD (hyperlipidemia)  - Continue Statin      VTE Risk Mitigation         Ordered     Medium Risk of VTE  Once      05/05/17 6019          CAREN Bolaños  Department of Hospital Medicine   [facility]

## 2017-05-06 NOTE — ANESTHESIA POSTPROCEDURE EVALUATION
"Anesthesia Post Evaluation    Patient: Doug Arauz    Procedure(s) Performed: Procedure(s) (LRB):  COLONOSCOPY (N/A)    Final Anesthesia Type: MAC  Patient location during evaluation: GI PACU  Patient participation: Yes- Able to Participate  Level of consciousness: awake and alert and oriented  Post-procedure vital signs: reviewed and stable  Pain management: adequate  Airway patency: patent  PONV status at discharge: No PONV  Anesthetic complications: no      Cardiovascular status: hemodynamically stable  Respiratory status: unassisted, room air and spontaneous ventilation  Hydration status: euvolemic  Follow-up not needed.        Visit Vitals    BP (!) 84/47 (BP Location: Right arm, Patient Position: Lying, BP Method: Automatic)    Pulse 93    Temp 36.8 °C (98.2 °F) (Oral)    Resp 20    Ht 5' 6" (1.676 m)    Wt 82.2 kg (181 lb 5 oz)    SpO2 97%    BMI 29.26 kg/m2       Pain/Radha Score: Pain Assessment Performed: Yes (5/6/2017  5:00 AM)  Presence of Pain: denies (5/6/2017  5:00 AM)  Radha Score: 5 (5/6/2017 11:21 AM)      "

## 2017-05-06 NOTE — ASSESSMENT & PLAN NOTE
- Gastroenterology following  - Transfused with 2 units of PRBCs upon admission -- Hgb/Hct 6.5/20.3  - Colonoscopy today -- diverticulosis, no source of bleeding found   - Transfuse 2 units of PRBCs post colonoscopy  - Continue PPI BID  - Supplemental oxygen prn, keep O2 sats > 92%  - May need video capsule outpatient  - Will monitor overnight and possible discharge in AM

## 2017-05-06 NOTE — CONSULTS
[facility] -   Gastroenterology  Consult Note    Patient Name: Doug Arauz  MRN: [id number]  Admission Date: 5/5/2017  Hospital Length of Stay: 1 days  Code Status: Full Code   Attending Provider: Niko Hernandez MD   Consulting Provider: Brennon Gomes Iii, MD  Primary Care Physician: Niko Camacho MD  Principal Problem:Lower GI bleed    Consults  Subjective:     HPI:  The patient presented to the ED last night with complaint of lower GI bleeding. He has a history of colon polyps when presenting to an ED 2 years ago and had his colonoscopy last year in follow-up. According to the patient it does not seem that the source of bleeding was ever found. He said nothing was found on his last study.     Yesterday when he came to the ED he complained of hematochezia going on since 11 in the morning. He was having fatigue and MORRIS; in the ED he was orthostatic and his Hgb 6.5 g. He had a bleeding scan with question of positive finding in the rectum. He needs Colonoscopy today. He has a FH of colon cancer in his maternal grandfather.      Past Medical History:   Diagnosis Date    Asthma     Colon polyps     Hiatal hernia     High cholesterol     Hypertension     Macular degeneration     RVOT ventricular tachycardia        Past Surgical History:   Procedure Laterality Date    APPENDECTOMY         Review of patient's allergies indicates:  No Known Allergies  Family History     Problem Relation (Age of Onset)    Alzheimer's disease Paternal Grandfather    Asthma Mother    Cancer Maternal Grandmother    Hypertension Father        Social History Main Topics    Smoking status: Never Smoker    Smokeless tobacco: Not on file    Alcohol use 0.6 oz/week     1 Glasses of wine per week    Drug use: No    Sexual activity: No     Review of Systems   Constitutional: Positive for fatigue. Negative for activity change, appetite change, chills, diaphoresis, fever and unexpected weight change.   HENT: Positive  for sneezing. Negative for congestion, ear discharge, facial swelling, hearing loss, nosebleeds, postnasal drip, sinus pressure, tinnitus, trouble swallowing and voice change.    Eyes: Negative for photophobia, redness and visual disturbance.   Respiratory: Positive for cough, chest tightness and wheezing. Negative for shortness of breath.    Cardiovascular: Negative for chest pain and palpitations.   Gastrointestinal: Positive for blood in stool. Negative for abdominal distention, abdominal pain, constipation, diarrhea, nausea, rectal pain and vomiting.   Genitourinary: Negative for difficulty urinating, discharge, dysuria, flank pain, frequency, hematuria, scrotal swelling, testicular pain and urgency.   Musculoskeletal: Negative for arthralgias, back pain, gait problem, joint swelling, myalgias and neck stiffness.   Skin: Positive for pallor. Negative for color change, rash and wound.   Neurological: Positive for weakness and light-headedness. Negative for dizziness, tremors, seizures, syncope, facial asymmetry, speech difficulty, numbness and headaches.   Hematological: Negative for adenopathy. Does not bruise/bleed easily.   Psychiatric/Behavioral: Negative for agitation, confusion, hallucinations, sleep disturbance and suicidal ideas.     Objective:     Vital Signs (Most Recent):  Temp: 98.2 °F (36.8 °C) (05/06/17 1120)  Pulse: 97 (05/06/17 1150)  Resp: 18 (05/06/17 1150)  BP: 111/60 (05/06/17 1150)  SpO2: 96 % (05/06/17 1150) Vital Signs (24h Range):  Temp:  [97.8 °F (36.6 °C)-98.9 °F (37.2 °C)] 98.2 °F (36.8 °C)  Pulse:  [] 97  Resp:  [16-29] 18  SpO2:  [95 %-100 %] 96 %  BP: ()/(47-87) 111/60     Weight: 82.2 kg (181 lb 5 oz) (05/06/17 0145)  Body mass index is 29.26 kg/(m^2).      Intake/Output Summary (Last 24 hours) at 05/06/17 1158  Last data filed at 05/06/17 0830   Gross per 24 hour   Intake          1035.42 ml   Output                0 ml   Net          1035.42 ml        Lines/Drains/Airways     Peripheral Intravenous Line                 Peripheral IV - Single Lumen 05/05/17 1645 Left Antecubital less than 1 day                Physical Exam   Constitutional: He is oriented to person, place, and time. He appears well-developed and well-nourished. No distress.   HENT:   Head: Normocephalic and atraumatic.   Nose: Nose normal.   Mouth/Throat: Oropharynx is clear and moist. No oropharyngeal exudate.   Eyes: Conjunctivae are normal. Pupils are equal, round, and reactive to light. No scleral icterus.   Conjunctivae slightly pale   Neck: Normal range of motion. Neck supple. No thyromegaly present.   Cardiovascular: Normal rate and regular rhythm.  Exam reveals no gallop and no friction rub.    No murmur heard.  Pulmonary/Chest: Effort normal and breath sounds normal. No respiratory distress. He has no wheezes. He has no rales.   Abdominal: Soft. Bowel sounds are normal. He exhibits no distension and no mass. There is no tenderness. There is no rebound and no guarding.   Musculoskeletal: He exhibits no edema or tenderness.   Lymphadenopathy:     He has no cervical adenopathy.   Neurological: He is alert and oriented to person, place, and time. He exhibits normal muscle tone. Coordination normal.   Skin: Skin is warm. No rash noted. He is not diaphoretic.   Psychiatric: He has a normal mood and affect. His behavior is normal. Judgment and thought content normal.   Vitals reviewed.      Significant Labs:  All pertinent lab results from the last 24 hours have been reviewed.    Significant Imaging:  Imaging results within the past 24 hours have been reviewed.    Assessment/Plan:     * Lower GI bleed  The patient has been prepped over night. He has been evaluated in the Endoscopy unit. He received transfusion of 2 units and will likel be given at least one more unit. Colonoscopy today.     Hypokalemia  He received potassium yesterday, but level is continued low. He is still in acceptable  range for anesthesia to allow for us to proceed with colonoscopy but will need continued replacement. Will notify hospital medicine.    HTN (hypertension)  Has received BP/cardiac meds this morning. Will be watching because of previous volume deficits.    HLD (hyperlipidemia)  As/per hospital medicine.      Thank you for your consult. I will follow-up with patient. Please contact us if you have any additional questions.    Brennon Gomes Iii, MD  Gastroenterology  [facility]

## 2017-05-07 ENCOUNTER — ANESTHESIA (OUTPATIENT)
Dept: ENDOSCOPY | Facility: HOSPITAL | Age: 42
DRG: 379 | End: 2017-05-07
Payer: COMMERCIAL

## 2017-05-07 ENCOUNTER — SURGERY (OUTPATIENT)
Age: 42
End: 2017-05-07

## 2017-05-07 ENCOUNTER — ANESTHESIA EVENT (OUTPATIENT)
Dept: ENDOSCOPY | Facility: HOSPITAL | Age: 42
DRG: 379 | End: 2017-05-07
Payer: COMMERCIAL

## 2017-05-07 VITALS
RESPIRATION RATE: 18 BRPM | WEIGHT: 181.31 LBS | HEIGHT: 66 IN | BODY MASS INDEX: 29.14 KG/M2 | HEART RATE: 81 BPM | OXYGEN SATURATION: 99 % | TEMPERATURE: 98 F | SYSTOLIC BLOOD PRESSURE: 134 MMHG | DIASTOLIC BLOOD PRESSURE: 74 MMHG

## 2017-05-07 LAB
ANION GAP SERPL CALC-SCNC: 11 MMOL/L
ANISOCYTOSIS BLD QL SMEAR: ABNORMAL
BASOPHILS NFR BLD: 0 %
BUN SERPL-MCNC: 7 MG/DL
CALCIUM SERPL-MCNC: 9.5 MG/DL
CHLORIDE SERPL-SCNC: 109 MMOL/L
CO2 SERPL-SCNC: 23 MMOL/L
CREAT SERPL-MCNC: 0.9 MG/DL
DACRYOCYTES BLD QL SMEAR: ABNORMAL
DIFFERENTIAL METHOD: ABNORMAL
EOSINOPHIL NFR BLD: 2 %
ERYTHROCYTE [DISTWIDTH] IN BLOOD BY AUTOMATED COUNT: 19.1 %
EST. GFR  (AFRICAN AMERICAN): >60 ML/MIN/1.73 M^2
EST. GFR  (NON AFRICAN AMERICAN): >60 ML/MIN/1.73 M^2
FERRITIN SERPL-MCNC: 4 NG/ML
GLUCOSE SERPL-MCNC: 86 MG/DL
HCT VFR BLD AUTO: 33.3 %
HCT VFR BLD AUTO: 36.2 %
HGB BLD-MCNC: 10.6 G/DL
HGB BLD-MCNC: 11.7 G/DL
HYPOCHROMIA BLD QL SMEAR: ABNORMAL
IRON SERPL-MCNC: 19 UG/DL
LYMPHOCYTES NFR BLD: 15 %
MCH RBC QN AUTO: 23.8 PG
MCHC RBC AUTO-ENTMCNC: 31.8 %
MCV RBC AUTO: 75 FL
MONOCYTES NFR BLD: 7 %
NEUTROPHILS NFR BLD: 76 %
OVALOCYTES BLD QL SMEAR: ABNORMAL
PLATELET # BLD AUTO: 303 K/UL
PLATELET BLD QL SMEAR: ABNORMAL
PMV BLD AUTO: 8.5 FL
POIKILOCYTOSIS BLD QL SMEAR: SLIGHT
POLYCHROMASIA BLD QL SMEAR: ABNORMAL
POTASSIUM SERPL-SCNC: 3.5 MMOL/L
RBC # BLD AUTO: 4.45 M/UL
SATURATED IRON: 4 %
SODIUM SERPL-SCNC: 143 MMOL/L
SPHEROCYTES BLD QL SMEAR: ABNORMAL
STOMATOCYTES BLD QL SMEAR: PRESENT
TARGETS BLD QL SMEAR: ABNORMAL
TOTAL IRON BINDING CAPACITY: 496 UG/DL
TRANSFERRIN SERPL-MCNC: 335 MG/DL
WBC # BLD AUTO: 8.42 K/UL

## 2017-05-07 PROCEDURE — 25000003 PHARM REV CODE 250: Performed by: NURSE PRACTITIONER

## 2017-05-07 PROCEDURE — 85027 COMPLETE CBC AUTOMATED: CPT

## 2017-05-07 PROCEDURE — 37000008 HC ANESTHESIA 1ST 15 MINUTES: Performed by: INTERNAL MEDICINE

## 2017-05-07 PROCEDURE — 80048 BASIC METABOLIC PNL TOTAL CA: CPT

## 2017-05-07 PROCEDURE — 85007 BL SMEAR W/DIFF WBC COUNT: CPT

## 2017-05-07 PROCEDURE — 63600175 PHARM REV CODE 636 W HCPCS: Performed by: NURSE ANESTHETIST, CERTIFIED REGISTERED

## 2017-05-07 PROCEDURE — 85018 HEMOGLOBIN: CPT

## 2017-05-07 PROCEDURE — 63600175 PHARM REV CODE 636 W HCPCS: Performed by: NURSE PRACTITIONER

## 2017-05-07 PROCEDURE — 36415 COLL VENOUS BLD VENIPUNCTURE: CPT

## 2017-05-07 PROCEDURE — 0DJD8ZZ INSPECTION OF LOWER INTESTINAL TRACT, VIA NATURAL OR ARTIFICIAL OPENING ENDOSCOPIC: ICD-10-PCS | Performed by: INTERNAL MEDICINE

## 2017-05-07 PROCEDURE — C9113 INJ PANTOPRAZOLE SODIUM, VIA: HCPCS | Performed by: NURSE PRACTITIONER

## 2017-05-07 PROCEDURE — 85014 HEMATOCRIT: CPT

## 2017-05-07 PROCEDURE — 45330 DIAGNOSTIC SIGMOIDOSCOPY: CPT | Mod: 51,,, | Performed by: INTERNAL MEDICINE

## 2017-05-07 PROCEDURE — 94761 N-INVAS EAR/PLS OXIMETRY MLT: CPT

## 2017-05-07 PROCEDURE — 45330 DIAGNOSTIC SIGMOIDOSCOPY: CPT | Performed by: INTERNAL MEDICINE

## 2017-05-07 PROCEDURE — 37000009 HC ANESTHESIA EA ADD 15 MINS: Performed by: INTERNAL MEDICINE

## 2017-05-07 PROCEDURE — 25000003 PHARM REV CODE 250: Performed by: NURSE ANESTHETIST, CERTIFIED REGISTERED

## 2017-05-07 PROCEDURE — 99232 SBSQ HOSP IP/OBS MODERATE 35: CPT | Mod: ,,, | Performed by: INTERNAL MEDICINE

## 2017-05-07 RX ORDER — PROPOFOL 10 MG/ML
VIAL (ML) INTRAVENOUS
Status: DISCONTINUED | OUTPATIENT
Start: 2017-05-07 | End: 2017-05-07

## 2017-05-07 RX ORDER — LIDOCAINE HYDROCHLORIDE 10 MG/ML
INJECTION INFILTRATION; PERINEURAL
Status: DISCONTINUED | OUTPATIENT
Start: 2017-05-07 | End: 2017-05-07

## 2017-05-07 RX ORDER — SODIUM CHLORIDE, SODIUM LACTATE, POTASSIUM CHLORIDE, CALCIUM CHLORIDE 600; 310; 30; 20 MG/100ML; MG/100ML; MG/100ML; MG/100ML
INJECTION, SOLUTION INTRAVENOUS CONTINUOUS PRN
Status: DISCONTINUED | OUTPATIENT
Start: 2017-05-07 | End: 2017-05-07

## 2017-05-07 RX ADMIN — LIDOCAINE HYDROCHLORIDE 40 MG: 10 INJECTION, SOLUTION INFILTRATION; PERINEURAL at 03:05

## 2017-05-07 RX ADMIN — PROPOFOL 50 MG: 10 INJECTION, EMULSION INTRAVENOUS at 03:05

## 2017-05-07 RX ADMIN — PANTOPRAZOLE SODIUM 40 MG: 40 INJECTION, POWDER, FOR SOLUTION INTRAVENOUS at 08:05

## 2017-05-07 RX ADMIN — ATORVASTATIN CALCIUM 10 MG: 10 TABLET, FILM COATED ORAL at 08:05

## 2017-05-07 RX ADMIN — SODIUM CHLORIDE, SODIUM LACTATE, POTASSIUM CHLORIDE, AND CALCIUM CHLORIDE: 600; 310; 30; 20 INJECTION, SOLUTION INTRAVENOUS at 03:05

## 2017-05-07 RX ADMIN — PROPOFOL 100 MG: 10 INJECTION, EMULSION INTRAVENOUS at 03:05

## 2017-05-07 NOTE — SUBJECTIVE & OBJECTIVE
Interval History: Pt seen and examined. Pt had another BM with BRB this morning. Currently NPO for possible procedure for continued bleeding.     Review of Systems   Constitutional: Positive for activity change. Negative for appetite change, chills, fatigue and fever.   HENT: Negative for tinnitus and trouble swallowing.    Eyes: Negative.    Respiratory: Positive for cough. Negative for apnea, choking, chest tightness, shortness of breath, wheezing and stridor.    Cardiovascular: Negative for chest pain, palpitations and leg swelling.   Gastrointestinal: Positive for blood in stool. Negative for abdominal pain, diarrhea, nausea and vomiting.   Endocrine: Negative.    Genitourinary: Negative.    Musculoskeletal: Negative.    Skin: Positive for pallor.   Allergic/Immunologic: Negative.    Neurological: Negative for dizziness, tremors, seizures, syncope, facial asymmetry, speech difficulty, weakness, light-headedness, numbness and headaches.   Hematological: Negative.      Objective:     Vital Signs (Most Recent):  Temp: 98.5 °F (36.9 °C) (05/07/17 1100)  Pulse: 83 (05/07/17 1115)  Resp: 18 (05/07/17 1100)  BP: 114/79 (05/07/17 1115)  SpO2: 99 % (05/07/17 1340) Vital Signs (24h Range):  Temp:  [97.9 °F (36.6 °C)-98.9 °F (37.2 °C)] 98.5 °F (36.9 °C)  Pulse:  [] 83  Resp:  [18-20] 18  SpO2:  [97 %-99 %] 99 %  BP: (103-137)/(61-85) 114/79     Weight: 82.2 kg (181 lb 5 oz)  Body mass index is 29.26 kg/(m^2).    Intake/Output Summary (Last 24 hours) at 05/07/17 1509  Last data filed at 05/07/17 0600   Gross per 24 hour   Intake             2080 ml   Output                0 ml   Net             2080 ml      Physical Exam   Constitutional: He is oriented to person, place, and time. He appears well-developed.   HENT:   Head: Normocephalic and atraumatic.   Eyes: Conjunctivae and EOM are normal.   Neck: Normal range of motion. Neck supple.   Cardiovascular: Normal rate, regular rhythm, normal heart sounds and intact  distal pulses.    Pulmonary/Chest: Effort normal and breath sounds normal. No respiratory distress.   Abdominal: Soft. Bowel sounds are normal. There is no tenderness.   Musculoskeletal: Normal range of motion.   Neurological: He is alert and oriented to person, place, and time.   Skin: Skin is warm and dry.   Psychiatric: He has a normal mood and affect. His behavior is normal. Judgment and thought content normal.   Vitals reviewed.      Significant Labs:   BMP:   Recent Labs  Lab 05/07/17  1430   GLU 86      K 3.5      CO2 23   BUN 7   CREATININE 0.9   CALCIUM 9.5     CBC:   Recent Labs  Lab 05/05/17  1645 05/06/17  0909 05/07/17  0532 05/07/17  1317   WBC 8.35 6.10 8.42  --    HGB 6.5* 7.8* 10.6* 11.7*   HCT 20.3* 25.5* 33.3* 36.2*   * 329 303  --        Significant Imaging:   Imaging Results         NM GI Bleed Study (Final result) Result time:  05/05/17 22:15:07    Final result by Zhang Car MD (05/05/17 22:15:07)    Impression:       1.  Over the 1st 55 minutes, no definite evidence for GI bleed, however, progressive uptake in the urinary bladder, likely representing free technetium, could obscure a very distal rectal or anal bleed.  2.  On the 60 minute image, there was a similar distribution of the radiopharmaceutical.  3.  At no time, was there evidence for abnormal uptake involving the small bowel or the vast majority of the colon.         Electronically signed by: ZHANG CAR MD  Date:     05/05/17  Time:    22:15     Narrative:    Nuclear medicine GI bleeding scan    Clinical Indication:    Lower GI bleeding with bright red blood per rectum.      Technique: 25 mCi of technetium 99m labeled tagged red blood cells was injected in the patient with imaging obtained for one hour.     Findings:  No comparison studies are available.  There was progressive activity centered in the inferior pelvis, likely representing the urinary bladder.  No other abnormal uptake was seen over the 1st 55  minutes of the study.  At the 55 minute daniele, the patient needs to urinate.  The patient went to the restroom, and urinated, and also described bright red blood per rectum.  A 60 minute image was obtained, demonstrating a similar appearance to the distribution of the radiopharmaceutical.            X-Ray Chest PA And Lateral (Final result) Result time:  05/05/17 16:56:57    Final result by Ignacio Fried III, MD (05/05/17 16:56:57)    Impression:         Negative two-view chest x-ray.      Electronically signed by: IGNACIO FRIED MD  Date:     05/05/17  Time:    16:56     Narrative:    Two-view chest x-ray.    Clinical indication: coughwith    Heart size is normal. The lung fields are clear. No acute pulmonary infiltrate.

## 2017-05-07 NOTE — PLAN OF CARE
Problem: Patient Care Overview  Goal: Plan of Care Review  Outcome: Ongoing (interventions implemented as appropriate)  POC discussed w/patient, verbalized understanding. SR on monitor. VSS. Denies pain or dizziness. 2nd of 2 units PRBCs completed this shift. Pt tolerated well. IVF followed as ordered. 1 episode of small amount of blood in stool; see prev note. Pt tolerated 1 jello and 1 applesauce well and looking forward to advancing diet in AM. Patient turns independently in bed. Fall precautions in place, bed alarm on.

## 2017-05-07 NOTE — ASSESSMENT & PLAN NOTE
He received potassium yesterday, but level is continued low. He is still in acceptable range for anesthesia to allow for us to proceed with colonoscopy but will need continued replacement. Will notify [facility] medicine.    5/7 awaiting repeat K+ level.

## 2017-05-07 NOTE — PROGRESS NOTES
[facility] -   Gastroenterology  Progress Note    Patient Name: Doug Arauz  MRN: [id number]  Admission Date: 5/5/2017  Hospital Length of Stay: 2 days  Code Status: Full Code   Attending Provider: Niko Hernandez MD  Consulting Provider: Brennon Gomes Iii, MD  Primary Care Physician: Niko Camacho MD  Principal Problem: Lower GI bleed      Subjective:     Interval History: The patient did well over night, and was transfused 2 units PRBCs yesterday. Hgb this AM was 10.6 at 0532 this morning. Subsequently he had another BM with BRB. His vitals remain stable and his adominal exam is unimpressive. Blood was seen in the commode, so I will check him again today with tap water enemas until clear and do a flex Sig. His only oral intake today was broth this morning.    Repeat Hgb post bloody BM is up to 11.7 g. The patient's BMP is pending to check for correction of potassium and volume status.     Review of Systems   Constitutional: Negative.  Negative for activity change, appetite change, chills, diaphoresis, fatigue, fever and unexpected weight change.   HENT: Positive for sneezing. Negative for congestion, ear discharge, facial swelling, hearing loss, nosebleeds, postnasal drip, sinus pressure, tinnitus, trouble swallowing and voice change.    Eyes: Negative for photophobia, redness and visual disturbance.   Respiratory: Positive for cough. Negative for chest tightness, shortness of breath and wheezing.    Cardiovascular: Negative for chest pain and palpitations.   Gastrointestinal: Positive for abdominal pain and blood in stool. Negative for abdominal distention, constipation, diarrhea, nausea, rectal pain and vomiting.   Genitourinary: Negative for difficulty urinating, discharge, dysuria, flank pain, frequency, hematuria, scrotal swelling, testicular pain and urgency.   Musculoskeletal: Negative for arthralgias, back pain, gait problem, joint swelling, myalgias and neck stiffness.   Skin:  Negative for color change, pallor, rash and wound.   Neurological: Positive for light-headedness. Negative for dizziness, tremors, seizures, syncope, facial asymmetry, speech difficulty, weakness, numbness and headaches.   Hematological: Negative for adenopathy. Does not bruise/bleed easily.   Psychiatric/Behavioral: Negative for agitation, confusion, hallucinations, sleep disturbance and suicidal ideas.     Objective:     Vital Signs (Most Recent):  Temp: 98.5 °F (36.9 °C) (05/07/17 1100)  Pulse: 83 (05/07/17 1115)  Resp: 18 (05/07/17 1100)  BP: 114/79 (05/07/17 1115)  SpO2: 99 % (05/07/17 1340) Vital Signs (24h Range):  Temp:  [97.9 °F (36.6 °C)-99.1 °F (37.3 °C)] 98.5 °F (36.9 °C)  Pulse:  [] 83  Resp:  [18-20] 18  SpO2:  [97 %-100 %] 99 %  BP: (103-137)/(61-85) 114/79     Weight: 82.2 kg (181 lb 5 oz) (05/06/17 0145)  Body mass index is 29.26 kg/(m^2).      Intake/Output Summary (Last 24 hours) at 05/07/17 1417  Last data filed at 05/07/17 0600   Gross per 24 hour   Intake             2430 ml   Output                0 ml   Net             2430 ml       Lines/Drains/Airways     Peripheral Intravenous Line                 Peripheral IV - Single Lumen 05/05/17 1645 Left Antecubital 1 day                Physical Exam   Constitutional: He is oriented to person, place, and time. He appears well-developed and well-nourished. No distress.   HENT:   Head: Normocephalic and atraumatic.   Nose: Nose normal.   Mouth/Throat: Oropharynx is clear and moist. No oropharyngeal exudate.   Eyes: Conjunctivae are normal. Pupils are equal, round, and reactive to light. No scleral icterus.   Neck: Normal range of motion. Neck supple. No thyromegaly present.   Cardiovascular: Normal rate and regular rhythm.  Exam reveals no gallop and no friction rub.    No murmur heard.  Pulmonary/Chest: Effort normal and breath sounds normal. No respiratory distress. He has no wheezes. He has no rales.   Abdominal: Soft. Bowel sounds are  normal. He exhibits no distension and no mass. There is no tenderness. There is no rebound and no guarding.   Musculoskeletal: He exhibits no edema or tenderness.   Lymphadenopathy:     He has no cervical adenopathy.   Neurological: He is alert and oriented to person, place, and time. He exhibits normal muscle tone. Coordination normal.   Skin: Skin is warm. No rash noted. He is not diaphoretic.   Psychiatric: He has a normal mood and affect. His behavior is normal. Judgment and thought content normal.   Vitals reviewed.      Significant Labs:  All pertinent lab results from the last 24 hours have been reviewed.      Significant Imaging:  Imaging results within the past 24 hours have been reviewed.   .    Assessment/Plan:     * Lower GI bleed  The patient has been prepped over night. He has been evaluated in the Endoscopy unit. He received transfusion of 2 units and will likel be given at least one more unit. Colonoscopy today.     5/7 Flex sig this afternoon.    Hypokalemia  He received potassium yesterday, but level is continued low. He is still in acceptable range for anesthesia to allow for us to proceed with colonoscopy but will need continued replacement. Will notify hospital medicine.    5/7 awaiting repeat K+ level.    HTN (hypertension)  Has received BP/cardiac meds this morning. Will be watching because of previous volume deficits.    5/7 Stable. Per/ hospital medicine      Thank you for your consult. I will follow-up with patient. Please contact us if you have any additional questions.    Brennon Gomes Iii, MD  Gastroenterology  [facility] -

## 2017-05-07 NOTE — TRANSFER OF CARE
"Anesthesia Transfer of Care Note    Patient: Doug Arauz    Procedure(s) Performed: Procedure(s) (LRB):  SIGMOIDOSCOPY-FLEXIBLE (N/A)    Patient location: GI    Anesthesia Type: MAC    Transport from OR: Transported from OR on room air with adequate spontaneous ventilation    Post pain: adequate analgesia    Post assessment: no apparent anesthetic complications    Post vital signs: stable    Level of consciousness: awake    Nausea/Vomiting: no nausea/vomiting    Complications: none          Last vitals:   Visit Vitals    /79 (BP Location: Right arm, Patient Position: Standing, BP Method: Automatic)    Pulse 83    Temp 36.9 °C (98.5 °F) (Oral)    Resp 18    Ht 5' 6" (1.676 m)    Wt 82.2 kg (181 lb 5 oz)    SpO2 99%    BMI 29.26 kg/m2     "

## 2017-05-07 NOTE — PROGRESS NOTES
Pt reported blood in stool. Small bowel movement and small amount of bk blood noted in toilet. Pt denies pain, dizziness, or any other complaint. Dr. Diaz notified, new orders for AM CBC. Will continue to monitor.

## 2017-05-07 NOTE — PROGRESS NOTES
"[facility] Hospital Medicine  Progress Note    Patient Name: Doug Arauz  MRN: [id number]  Patient Class: IP- Inpatient   Admission Date: 5/5/2017  Length of Stay: 2 days  Attending Physician: Niko Hernandez MD  Primary Care Provider: Niko Camacho MD        Subjective:     Principal Problem:Lower GI bleed    HPI:  Doug Arauz is a 42 year old male with a PMHx of HTN, HLD, Asthma, and Hiatal hernia who presented to the Emergency Department with c/o blood in stool x 4 hours. Pt states going to the bathroom and "1 gallon of blood came out." ED workup revealed: Hgb/Hct 6.5/20.3, K+ 3.2, BUN 23. ED discussed case with GI who recommending bleeding scan, clear liquids overnight, and possible colonoscopy tomorrow. Pt admitted to Telemetry for GI Bleed.     Hospital Course:  Doug Arauz is a 42 year old male admitted for GI Bleed. Pt was transfused overnight with 2 units of PRBCs upon admission due to Hgb/Hct of 6.5/20.3. Hgb/Hct improved to 7.8/25.5 on AM labs. Pt underwent colonoscopy performed by Dr. Gomes (GI) today, 05/06/17. Colonoscopy found diverticulosis, no source of bleed. May need video capsule as outpatient. Dr. Gomes recommends transfusing 2 units of PRBCs post colonoscopy and monitor overnight.      05/07/17 - No acute events overnight. Pt experienced episode bright red rectal bleeding this morning around 0730. Hgb/Hct stable on AM labs. Pt is currently NPO for possible procedure recommended by GI for continued bleeding. Pt also reports his sister was recently diagnosed with "watermelon stomach" (GAVE) and to inform Dr. Gomes for possible relation.       Interval History: Pt seen and examined. Pt had another BM with BRB this morning. Currently NPO for possible procedure for continued bleeding.     Review of Systems   Constitutional: Positive for activity change. Negative for appetite change, chills, fatigue and fever.   HENT: Negative for tinnitus and trouble swallowing.  "   Eyes: Negative.    Respiratory: Positive for cough. Negative for apnea, choking, chest tightness, shortness of breath, wheezing and stridor.    Cardiovascular: Negative for chest pain, palpitations and leg swelling.   Gastrointestinal: Positive for blood in stool. Negative for abdominal pain, diarrhea, nausea and vomiting.   Endocrine: Negative.    Genitourinary: Negative.    Musculoskeletal: Negative.    Skin: Positive for pallor.   Allergic/Immunologic: Negative.    Neurological: Negative for dizziness, tremors, seizures, syncope, facial asymmetry, speech difficulty, weakness, light-headedness, numbness and headaches.   Hematological: Negative.      Objective:     Vital Signs (Most Recent):  Temp: 98.5 °F (36.9 °C) (05/07/17 1100)  Pulse: 83 (05/07/17 1115)  Resp: 18 (05/07/17 1100)  BP: 114/79 (05/07/17 1115)  SpO2: 99 % (05/07/17 1340) Vital Signs (24h Range):  Temp:  [97.9 °F (36.6 °C)-98.9 °F (37.2 °C)] 98.5 °F (36.9 °C)  Pulse:  [] 83  Resp:  [18-20] 18  SpO2:  [97 %-99 %] 99 %  BP: (103-137)/(61-85) 114/79     Weight: 82.2 kg (181 lb 5 oz)  Body mass index is 29.26 kg/(m^2).    Intake/Output Summary (Last 24 hours) at 05/07/17 1509  Last data filed at 05/07/17 0600   Gross per 24 hour   Intake             2080 ml   Output                0 ml   Net             2080 ml      Physical Exam   Constitutional: He is oriented to person, place, and time. He appears well-developed.   HENT:   Head: Normocephalic and atraumatic.   Eyes: Conjunctivae and EOM are normal.   Neck: Normal range of motion. Neck supple.   Cardiovascular: Normal rate, regular rhythm, normal heart sounds and intact distal pulses.    Pulmonary/Chest: Effort normal and breath sounds normal. No respiratory distress.   Abdominal: Soft. Bowel sounds are normal. There is no tenderness.   Musculoskeletal: Normal range of motion.   Neurological: He is alert and oriented to person, place, and time.   Skin: Skin is warm and dry.   Psychiatric: He  has a normal mood and affect. His behavior is normal. Judgment and thought content normal.   Vitals reviewed.      Significant Labs:   BMP:   Recent Labs  Lab 05/07/17  1430   GLU 86      K 3.5      CO2 23   BUN 7   CREATININE 0.9   CALCIUM 9.5     CBC:   Recent Labs  Lab 05/05/17  1645 05/06/17  0909 05/07/17  0532 05/07/17  1317   WBC 8.35 6.10 8.42  --    HGB 6.5* 7.8* 10.6* 11.7*   HCT 20.3* 25.5* 33.3* 36.2*   * 329 303  --        Significant Imaging:   Imaging Results         NM GI Bleed Study (Final result) Result time:  05/05/17 22:15:07    Final result by Zhang Car MD (05/05/17 22:15:07)    Impression:       1.  Over the 1st 55 minutes, no definite evidence for GI bleed, however, progressive uptake in the urinary bladder, likely representing free technetium, could obscure a very distal rectal or anal bleed.  2.  On the 60 minute image, there was a similar distribution of the radiopharmaceutical.  3.  At no time, was there evidence for abnormal uptake involving the small bowel or the vast majority of the colon.         Electronically signed by: ZHANG CAR MD  Date:     05/05/17  Time:    22:15     Narrative:    Nuclear medicine GI bleeding scan    Clinical Indication:    Lower GI bleeding with bright red blood per rectum.      Technique: 25 mCi of technetium 99m labeled tagged red blood cells was injected in the patient with imaging obtained for one hour.     Findings:  No comparison studies are available.  There was progressive activity centered in the inferior pelvis, likely representing the urinary bladder.  No other abnormal uptake was seen over the 1st 55 minutes of the study.  At the 55 minute daniele, the patient needs to urinate.  The patient went to the restroom, and urinated, and also described bright red blood per rectum.  A 60 minute image was obtained, demonstrating a similar appearance to the distribution of the radiopharmaceutical.            X-Ray Chest PA And Lateral  (Final result) Result time:  05/05/17 16:56:57    Final result by Ignacio Fried III, MD (05/05/17 16:56:57)    Impression:         Negative two-view chest x-ray.      Electronically signed by: IGNACIO FRIED MD  Date:     05/05/17  Time:    16:56     Narrative:    Two-view chest x-ray.    Clinical indication: coughwith    Heart size is normal. The lung fields are clear. No acute pulmonary infiltrate.            Assessment/Plan:      * Lower GI bleed  - Gastroenterology following  - Pt had BM with BRB around 0730 this morning  - Hgb/Hct currently stable  - NPO for possible procedure by GI for continued bleeding  - Transfused with 2 units of PRBCs upon admission -- Hgb/Hct 6.5/20.3  - Colonoscopy on 05/06/17 -- diverticulosis, no source of bleeding found   - Transfused with 2 units of PRBCs post colonoscopy  - Continue PPI BID  - Supplemental oxygen prn, keep O2 sats > 92%        Hypokalemia  - To replete  - Repeat BMP in AM      HTN (hypertension)  - Continue to hold Amlodipine and HCTZ -- continued bleeding -- avoid hypotension  - Resume BB with parameters -- Hold BB if systolic BP < 110 mmHg and/or HR < 60 BPM      HLD (hyperlipidemia)  - Continue Statin      VTE Risk Mitigation         Ordered     Medium Risk of VTE  Once      05/05/17 1832          CAREN Bolaños  Department of Hospital Medicine   [facility]

## 2017-05-07 NOTE — ANESTHESIA RELEASE NOTE
"Anesthesia Release from PACU Note    Patient: Doug Arauz    Procedure(s) Performed: Procedure(s) (LRB):  SIGMOIDOSCOPY-FLEXIBLE (N/A)    Anesthesia type: MAC    Post pain: Adequate analgesia    Post assessment: no apparent anesthetic complications, tolerated procedure well and no evidence of recall    Last Vitals:   Visit Vitals    /81    Pulse 80    Temp 36.8 °C (98.2 °F)    Resp 18    Ht 5' 6" (1.676 m)    Wt 82.2 kg (181 lb 5 oz)    SpO2 97%    BMI 29.26 kg/m2       Post vital signs: stable    Level of consciousness: awake, alert  and oriented    Nausea/Vomiting: no nausea/no vomiting    Complications: none    Airway Patency: patent    Respiratory: unassisted, spontaneous ventilation    Cardiovascular: stable and blood pressure at baseline    Hydration: euvolemic  "

## 2017-05-07 NOTE — ANESTHESIA POSTPROCEDURE EVALUATION
"Anesthesia Post Evaluation    Patient: Doug Arauz    Procedure(s) Performed: Procedure(s) (LRB):  SIGMOIDOSCOPY-FLEXIBLE (N/A)    Final Anesthesia Type: MAC  Patient location during evaluation: GI PACU  Patient participation: Yes- Able to Participate  Level of consciousness: awake and alert  Post-procedure vital signs: reviewed and stable  Pain management: adequate  Airway patency: patent  PONV status at discharge: No PONV  Anesthetic complications: no      Cardiovascular status: blood pressure returned to baseline  Respiratory status: unassisted and spontaneous ventilation  Hydration status: euvolemic  Follow-up not needed.        Visit Vitals    /81    Pulse 80    Temp 36.8 °C (98.2 °F)    Resp 18    Ht 5' 6" (1.676 m)    Wt 82.2 kg (181 lb 5 oz)    SpO2 97%    BMI 29.26 kg/m2       Pain/Radha Score: Pain Assessment Performed: Yes (5/7/2017  3:00 PM)  Presence of Pain: denies (5/7/2017  3:00 PM)  Radha Score: 10 (5/6/2017 11:50 AM)      "

## 2017-05-07 NOTE — ASSESSMENT & PLAN NOTE
The patient has been prepped over night. He has been evaluated in the Endoscopy unit. He received transfusion of 2 units and will likel be given at least one more unit. Colonoscopy today.     5/7 Flex sig this afternoon.

## 2017-05-07 NOTE — ASSESSMENT & PLAN NOTE
- Continue to hold Amlodipine and HCTZ -- continued bleeding -- avoid hypotension  - Resume BB with parameters -- Hold BB if systolic BP < 110 mmHg and/or HR < 60 BPM

## 2017-05-07 NOTE — ANESTHESIA PREPROCEDURE EVALUATION
05/07/2017  Doug Arauz is a 42 y.o., male.    Anesthesia Evaluation    I have reviewed the Patient Summary Reports.    I have reviewed the Nursing Notes.   I have reviewed the Medications.     Review of Systems  Anesthesia Hx:  No problems with previous Anesthesia    Social:  Non-Smoker, No Alcohol Use    Hematology/Oncology:  Hematology Normal   Oncology Normal     EENT/Dental:EENT/Dental Normal   Cardiovascular:   Exercise tolerance: good Hypertension, well controlled Dysrhythmias    Pulmonary:   Asthma asymptomatic    Renal/:  Renal/ Normal     Hepatic/GI:   Hiatal Hernia,    Musculoskeletal:  Musculoskeletal Normal    Neurological:   Neuromuscular Disease,    Endocrine:  Endocrine Normal    Dermatological:  Skin Normal    Psych:  Psychiatric Normal           Physical Exam  General:  Well nourished    Airway/Jaw/Neck:  Airway Findings: Mouth Opening: Normal Tongue: Normal  General Airway Assessment: Adult  Mallampati: II  TM Distance: Normal, at least 6 cm  Jaw/Neck Findings:  Neck ROM: Normal ROM      Dental:  Dental Findings: In tact    Chest/Lungs:  Chest/Lungs Findings: Clear to auscultation, Normal Respiratory Rate     Heart/Vascular:  Heart Findings: Rate: Normal  Rhythm: Regular Rhythm  Sounds: Normal        Mental Status:  Mental Status Findings:  Cooperative, Alert and Oriented         Anesthesia Plan  Type of Anesthesia, risks & benefits discussed:  Anesthesia Type:  MAC  Patient's Preference:   Intra-op Monitoring Plan:   Intra-op Monitoring Plan Comments:   Post Op Pain Control Plan:   Post Op Pain Control Plan Comments:   Induction:   IV  Beta Blocker:  Patient is on a Beta-Blocker and has received one dose within the past 24 hours (No further documentation required).       Informed Consent: Patient understands risks and agrees with Anesthesia plan.  Questions answered. Anesthesia  consent signed with patient.  ASA Score: 2  emergent   Day of Surgery Review of History & Physical: I have interviewed and examined the patient. I have reviewed the patient's H&P dated: 05/06/2017. There are no significant changes.          Ready For Surgery From Anesthesia Perspective.

## 2017-05-07 NOTE — ASSESSMENT & PLAN NOTE
Has received BP/cardiac meds this morning. Will be watching because of previous volume deficits.    5/7 Stable. Per/ hospital medicine

## 2017-05-07 NOTE — PROGRESS NOTES
Pt reported passing blood in BM. Bright red blood visualized in toilet, no feces noted. Dr. Diaz notified. New orders for NPO. Awaiting results of CBC. Pt notified and verbalized understanding. Day nurse to be notified.

## 2017-05-07 NOTE — ASSESSMENT & PLAN NOTE
- Gastroenterology following  - Pt had BM with BRB around 0730 this morning  - Hgb/Hct currently stable  - NPO for possible procedure by GI for continued bleeding  - Transfused with 2 units of PRBCs upon admission -- Hgb/Hct 6.5/20.3  - Colonoscopy on 05/06/17 -- diverticulosis, no source of bleeding found   - Transfused with 2 units of PRBCs post colonoscopy  - Continue PPI BID  - Supplemental oxygen prn, keep O2 sats > 92%

## 2017-05-07 NOTE — PROGRESS NOTES
Pt tolerated tap water enema well, approx 1200 ml infused until pt was unable to tolerate, mostly clear output, medium amount of blood noted during bowel movement, pt remains NPO.

## 2017-05-07 NOTE — SUBJECTIVE & OBJECTIVE
Subjective:     Interval History: The patient did well over night, and was transfused 2 units PRBCs yesterday. Hgb this AM was 10.6 at 0532 this morning. Subsequently he had another BM with BRB. His vitals remain stable and his adominal exam is unimpressive. Blood was seen in the commode, so I will check him again today with tap water enemas until clear and do a flex Sig. His only oral intake today was broth this morning.    Repeat Hgb post bloody BM is up to 11.7 g. The patient's BMP is pending to check for correction of potassium and volume status.     Review of Systems   Constitutional: Negative.  Negative for activity change, appetite change, chills, diaphoresis, fatigue, fever and unexpected weight change.   HENT: Positive for sneezing. Negative for congestion, ear discharge, facial swelling, hearing loss, nosebleeds, postnasal drip, sinus pressure, tinnitus, trouble swallowing and voice change.    Eyes: Negative for photophobia, redness and visual disturbance.   Respiratory: Positive for cough. Negative for chest tightness, shortness of breath and wheezing.    Cardiovascular: Negative for chest pain and palpitations.   Gastrointestinal: Positive for abdominal pain and blood in stool. Negative for abdominal distention, constipation, diarrhea, nausea, rectal pain and vomiting.   Genitourinary: Negative for difficulty urinating, discharge, dysuria, flank pain, frequency, hematuria, scrotal swelling, testicular pain and urgency.   Musculoskeletal: Negative for arthralgias, back pain, gait problem, joint swelling, myalgias and neck stiffness.   Skin: Negative for color change, pallor, rash and wound.   Neurological: Positive for light-headedness. Negative for dizziness, tremors, seizures, syncope, facial asymmetry, speech difficulty, weakness, numbness and headaches.   Hematological: Negative for adenopathy. Does not bruise/bleed easily.   Psychiatric/Behavioral: Negative for agitation, confusion, hallucinations,  sleep disturbance and suicidal ideas.     Objective:     Vital Signs (Most Recent):  Temp: 98.5 °F (36.9 °C) (05/07/17 1100)  Pulse: 83 (05/07/17 1115)  Resp: 18 (05/07/17 1100)  BP: 114/79 (05/07/17 1115)  SpO2: 99 % (05/07/17 1340) Vital Signs (24h Range):  Temp:  [97.9 °F (36.6 °C)-99.1 °F (37.3 °C)] 98.5 °F (36.9 °C)  Pulse:  [] 83  Resp:  [18-20] 18  SpO2:  [97 %-100 %] 99 %  BP: (103-137)/(61-85) 114/79     Weight: 82.2 kg (181 lb 5 oz) (05/06/17 0145)  Body mass index is 29.26 kg/(m^2).      Intake/Output Summary (Last 24 hours) at 05/07/17 1417  Last data filed at 05/07/17 0600   Gross per 24 hour   Intake             2430 ml   Output                0 ml   Net             2430 ml       Lines/Drains/Airways     Peripheral Intravenous Line                 Peripheral IV - Single Lumen 05/05/17 1645 Left Antecubital 1 day                Physical Exam   Constitutional: He is oriented to person, place, and time. He appears well-developed and well-nourished. No distress.   HENT:   Head: Normocephalic and atraumatic.   Nose: Nose normal.   Mouth/Throat: Oropharynx is clear and moist. No oropharyngeal exudate.   Eyes: Conjunctivae are normal. Pupils are equal, round, and reactive to light. No scleral icterus.   Neck: Normal range of motion. Neck supple. No thyromegaly present.   Cardiovascular: Normal rate and regular rhythm.  Exam reveals no gallop and no friction rub.    No murmur heard.  Pulmonary/Chest: Effort normal and breath sounds normal. No respiratory distress. He has no wheezes. He has no rales.   Abdominal: Soft. Bowel sounds are normal. He exhibits no distension and no mass. There is no tenderness. There is no rebound and no guarding.   Musculoskeletal: He exhibits no edema or tenderness.   Lymphadenopathy:     He has no cervical adenopathy.   Neurological: He is alert and oriented to person, place, and time. He exhibits normal muscle tone. Coordination normal.   Skin: Skin is warm. No rash noted.  He is not diaphoretic.   Psychiatric: He has a normal mood and affect. His behavior is normal. Judgment and thought content normal.   Vitals reviewed.      Significant Labs:  All pertinent lab results from the last 24 hours have been reviewed.      Significant Imaging:  Imaging results within the past 24 hours have been reviewed.   .

## 2017-05-07 NOTE — DISCHARGE SUMMARY
"[facility] Hospital Medicine  Discharge Summary      Patient Name: Doug Arauz  MRN: [id number]  Admission Date: 5/5/2017  Hospital Length of Stay: 2 days  Discharge Date and Time:  05/07/2017 5:46 PM  Attending Physician: Niko Hernandez MD   Discharging Provider: CAREN Bolaños  Primary Care Provider: Niko Camacho MD      HPI:   Doug Arauz is a 42 year old male with a PMHx of HTN, HLD, Asthma, and Hiatal hernia who presented to the Emergency Department with c/o blood in stool x 4 hours. Pt states going to the bathroom and "1 gallon of blood came out." ED workup revealed: Hgb/Hct 6.5/20.3, K+ 3.2, BUN 23. ED discussed case with GI who recommending bleeding scan, clear liquids overnight, and possible colonoscopy tomorrow. Pt admitted to Telemetry for GI Bleed.     Procedure(s) (LRB):  SIGMOIDOSCOPY-FLEXIBLE (N/A)      Indwelling Lines/Drains at time of discharge:   Lines/Drains/Airways          No matching active lines, drains, or airways        Hospital Course:   Doug Arauz is a 42 year old male admitted for GI Bleed. Hgb/Hct 6.5/20 upon admission. GI consulted and recommended colonoscopy. Pt received 2 units of PRBCs before and after colonoscopy for a total of 4 units during admission. Pt underwent colonoscopy performed by Dr. Gomes (GI) on 05/06/17. Colonoscopy found diverticulosis, no source of bleed. Pt was monitored overnight and had a BM with BRB this morning. Dr. Gomes performed flexible sigmoidoscopy on 05/07/17 and found non bleeding internal hemorrhoids. Post flex sig, Dr. Gomes recommended advancing diet and discharging home. Hgb/Hct prior to discharge stable at 11.7/36.2. Pt to follow up with Dr. Gomes in one week for hospital follow up. Pt seen and examined on the date of discharge and determined suitable for discharge.                Consults:   Consults         Status Ordering Provider     Inpatient consult to Gastroenterology  Once     Provider:  Brennon" DAXA Gomes III, MD    Acknowledged JOANN HERNANDEZ          Significant Diagnostic Studies: Labs:   BMP:   Recent Labs  Lab 05/06/17  0909 05/07/17  1430   GLU 96 86    143   K 3.1* 3.5    109   CO2 23 23   BUN 11 7   CREATININE 0.8 0.9   CALCIUM 8.6* 9.5    and CBC   Recent Labs  Lab 05/06/17  0909 05/07/17  0532 05/07/17  1317   WBC 6.10 8.42  --    HGB 7.8* 10.6* 11.7*   HCT 25.5* 33.3* 36.2*    303  --      Radiology:   Imaging Results         NM GI Bleed Study (Final result) Result time:  05/05/17 22:15:07    Final result by Zhang Car MD (05/05/17 22:15:07)    Impression:       1.  Over the 1st 55 minutes, no definite evidence for GI bleed, however, progressive uptake in the urinary bladder, likely representing free technetium, could obscure a very distal rectal or anal bleed.  2.  On the 60 minute image, there was a similar distribution of the radiopharmaceutical.  3.  At no time, was there evidence for abnormal uptake involving the small bowel or the vast majority of the colon.         Electronically signed by: ZHANG CAR MD  Date:     05/05/17  Time:    22:15     Narrative:    Nuclear medicine GI bleeding scan    Clinical Indication:    Lower GI bleeding with bright red blood per rectum.      Technique: 25 mCi of technetium 99m labeled tagged red blood cells was injected in the patient with imaging obtained for one hour.     Findings:  No comparison studies are available.  There was progressive activity centered in the inferior pelvis, likely representing the urinary bladder.  No other abnormal uptake was seen over the 1st 55 minutes of the study.  At the 55 minute daniele, the patient needs to urinate.  The patient went to the restroom, and urinated, and also described bright red blood per rectum.  A 60 minute image was obtained, demonstrating a similar appearance to the distribution of the radiopharmaceutical.            X-Ray Chest PA And Lateral (Final result) Result time:   05/05/17 16:56:57    Final result by Ignacio Fried III, MD (05/05/17 16:56:57)    Impression:         Negative two-view chest x-ray.      Electronically signed by: IGNACIO FRIED MD  Date:     05/05/17  Time:    16:56     Narrative:    Two-view chest x-ray.    Clinical indication: coughwith    Heart size is normal. The lung fields are clear. No acute pulmonary infiltrate.              Pending Diagnostic Studies:     Procedure Component Value Units Date/Time    Ferritin [[id number]] Collected:  05/06/17 1301    Order Status:  Sent Lab Status:  In process Updated:  05/07/17 1727    Specimen:  Blood from Blood     Iron and TIBC [[id number]] Collected:  05/06/17 1301    Order Status:  Sent Lab Status:  In process Updated:  05/07/17 1727    Specimen:  Blood from Blood         Final Active Diagnoses:    Diagnosis Date Noted POA    PRINCIPAL PROBLEM:  Lower GI bleed [K92.2] 05/05/2017 Yes    Hypokalemia [E87.6] 05/05/2017 Yes    HTN (hypertension) [I10] 05/05/2017 Yes    HLD (hyperlipidemia) [E78.5] 05/05/2017 Yes      Problems Resolved During this Admission:    Diagnosis Date Noted Date Resolved POA        Discharged Condition: stable    Disposition: Home or Self Care    Follow Up:  Follow-up Information     Follow up with Niko Camacho MD. Schedule an appointment as soon as possible for a visit in 3 days.    Specialty:  Internal Medicine    Why:  hospital follow up    Contact information:    [address] [facility] [address] [phone]          Follow up with Brennon Gomes Iii, MD. Schedule an appointment as soon as possible for a visit in 1 week.    Specialty:  Gastroenterology    Why:  hospital follow up    Contact information:    [address] [facility] [address] [phone]          Patient Instructions:     Diet general     Activity as tolerated     Call MD for:  increased confusion or weakness     Call MD for:  persistent dizziness, light-headedness, or visual disturbances      Call MD for:  difficulty breathing or increased cough       Medications:  Reconciled Home Medications:   Current Discharge Medication List      CONTINUE these medications which have NOT CHANGED    Details   ALBUTEROL SULFATE (PROAIR HFA INHL) Inhale into the lungs.      alprazolam (XANAX) 0.5 MG tablet Take 0.5 mg by mouth 3 (three) times daily.      amlodipine (NORVASC) 5 MG tablet Take 5 mg by mouth once daily.      atorvastatin (LIPITOR) 10 MG tablet Take 10 mg by mouth once daily.      hydrochlorothiazide (HYDRODIURIL) 12.5 MG Tab Take 12.5 mg by mouth once daily.      LORATADINE (CLARITIN ORAL) Take by mouth.      multivitamin capsule Take 1 capsule by mouth once daily.      propranolol (INNOPRAN XL) 80 mg 24 hr capsule Take 80 mg by mouth every evening.           Time spent on the discharge of patient: 35 minutes    CAREN Bolaños  Department of Hospital Medicine  [facility]

## 2017-05-07 NOTE — PLAN OF CARE
Problem: Patient Care Overview  Goal: Plan of Care Review  Outcome: Ongoing (interventions implemented as appropriate)  Pt oriented x 4, no distress noted, no c/o pain, normal sinus rhythm, NPO status today, active rectal bleeding during bowel movements, continue with Endo consult to have procedure this afternoon, pt aware of plan, participating in his plan of care, independence encouraged, orthostatic v/s performed this shift, pt tolerated well.

## 2017-05-08 NOTE — PROGRESS NOTES
Pt tolerated meal well, consumed 100%, no distress noted, no c/o pain, no bowel movement since procedure, pt d/c's home, discharge teaching discussed with pt, Iv access removed, heart monitor removed by pt, pt wheeled to car.

## 2021-05-12 ENCOUNTER — PATIENT MESSAGE (OUTPATIENT)
Dept: RESEARCH | Facility: HOSPITAL | Age: 46
End: 2021-05-12